# Patient Record
Sex: MALE | Race: WHITE | NOT HISPANIC OR LATINO | URBAN - METROPOLITAN AREA
[De-identification: names, ages, dates, MRNs, and addresses within clinical notes are randomized per-mention and may not be internally consistent; named-entity substitution may affect disease eponyms.]

---

## 2017-02-06 ENCOUNTER — OFFICE VISIT - HEALTHEAST (OUTPATIENT)
Dept: FAMILY MEDICINE | Facility: CLINIC | Age: 67
End: 2017-02-06

## 2017-02-06 DIAGNOSIS — B96.89 ACUTE BACTERIAL SINUSITIS: ICD-10-CM

## 2017-02-06 DIAGNOSIS — J01.90 ACUTE BACTERIAL SINUSITIS: ICD-10-CM

## 2017-02-08 ENCOUNTER — OFFICE VISIT - HEALTHEAST (OUTPATIENT)
Dept: FAMILY MEDICINE | Facility: CLINIC | Age: 67
End: 2017-02-08

## 2017-02-08 DIAGNOSIS — J32.9 SINUSITIS: ICD-10-CM

## 2017-02-08 DIAGNOSIS — L40.50 PSORIATIC ARTHROPATHY (H): ICD-10-CM

## 2017-02-08 DIAGNOSIS — Z12.11 SCREEN FOR COLON CANCER: ICD-10-CM

## 2017-02-16 ENCOUNTER — COMMUNICATION - HEALTHEAST (OUTPATIENT)
Dept: FAMILY MEDICINE | Facility: CLINIC | Age: 67
End: 2017-02-16

## 2017-02-16 DIAGNOSIS — J32.9 SINUSITIS: ICD-10-CM

## 2017-02-27 ENCOUNTER — OFFICE VISIT - HEALTHEAST (OUTPATIENT)
Dept: FAMILY MEDICINE | Facility: CLINIC | Age: 67
End: 2017-02-27

## 2017-02-27 DIAGNOSIS — J02.9 SORE THROAT: ICD-10-CM

## 2017-02-27 DIAGNOSIS — E11.9 DIABETES MELLITUS TYPE 2, NONINSULIN DEPENDENT (H): ICD-10-CM

## 2017-02-27 DIAGNOSIS — E78.00 PURE HYPERCHOLESTEROLEMIA: ICD-10-CM

## 2017-02-27 DIAGNOSIS — L40.8 OTHER PSORIASIS: ICD-10-CM

## 2017-02-27 LAB
HBA1C MFR BLD: 7.6 % (ref 3.5–6)
LDLC SERPL CALC-MCNC: 152 MG/DL

## 2017-02-27 ASSESSMENT — MIFFLIN-ST. JEOR: SCORE: 1810.9

## 2017-02-28 ENCOUNTER — COMMUNICATION - HEALTHEAST (OUTPATIENT)
Dept: FAMILY MEDICINE | Facility: CLINIC | Age: 67
End: 2017-02-28

## 2017-04-17 ENCOUNTER — COMMUNICATION - HEALTHEAST (OUTPATIENT)
Dept: ADMINISTRATIVE | Facility: CLINIC | Age: 67
End: 2017-04-17

## 2017-04-17 DIAGNOSIS — L40.50 PSORIATIC ARTHROPATHY (H): ICD-10-CM

## 2017-05-05 ENCOUNTER — RECORDS - HEALTHEAST (OUTPATIENT)
Dept: ADMINISTRATIVE | Facility: OTHER | Age: 67
End: 2017-05-05

## 2017-05-08 ENCOUNTER — RECORDS - HEALTHEAST (OUTPATIENT)
Dept: ADMINISTRATIVE | Facility: OTHER | Age: 67
End: 2017-05-08

## 2017-12-18 ENCOUNTER — OFFICE VISIT - HEALTHEAST (OUTPATIENT)
Dept: FAMILY MEDICINE | Facility: CLINIC | Age: 67
End: 2017-12-18

## 2017-12-18 DIAGNOSIS — J32.9 SINUSITIS: ICD-10-CM

## 2017-12-18 ASSESSMENT — MIFFLIN-ST. JEOR: SCORE: 1810.56

## 2018-01-22 ENCOUNTER — OFFICE VISIT - HEALTHEAST (OUTPATIENT)
Dept: FAMILY MEDICINE | Facility: CLINIC | Age: 68
End: 2018-01-22

## 2018-01-22 DIAGNOSIS — E78.00 PURE HYPERCHOLESTEROLEMIA: ICD-10-CM

## 2018-01-22 DIAGNOSIS — M54.16 LUMBAR RADICULOPATHY: ICD-10-CM

## 2018-01-22 DIAGNOSIS — L40.50 PSORIATIC ARTHROPATHY (H): ICD-10-CM

## 2018-01-22 DIAGNOSIS — J32.9 SINUSITIS: ICD-10-CM

## 2018-01-22 DIAGNOSIS — M54.9 BACK PAIN: ICD-10-CM

## 2018-01-22 DIAGNOSIS — M47.817 LUMBOSACRAL SPONDYLOSIS WITHOUT MYELOPATHY: ICD-10-CM

## 2018-01-22 DIAGNOSIS — Z23 NEED FOR VACCINATION: ICD-10-CM

## 2018-01-22 DIAGNOSIS — E11.9 DIABETES MELLITUS TYPE 2, NONINSULIN DEPENDENT (H): ICD-10-CM

## 2018-01-22 LAB
ALBUMIN UR-MCNC: ABNORMAL MG/DL
APPEARANCE UR: CLEAR
BILIRUB UR QL STRIP: ABNORMAL
COLOR UR AUTO: YELLOW
GLUCOSE UR STRIP-MCNC: ABNORMAL MG/DL
HBA1C MFR BLD: 10.7 % (ref 3.5–6)
HGB UR QL STRIP: NEGATIVE
KETONES UR STRIP-MCNC: ABNORMAL MG/DL
LEUKOCYTE ESTERASE UR QL STRIP: NEGATIVE
NITRATE UR QL: NEGATIVE
PH UR STRIP: 5.5 [PH] (ref 5–8)
SP GR UR STRIP: >=1.03 (ref 1–1.03)
UROBILINOGEN UR STRIP-ACNC: ABNORMAL

## 2018-01-22 ASSESSMENT — MIFFLIN-ST. JEOR: SCORE: 1770.3

## 2018-01-23 LAB
ALBUMIN SERPL-MCNC: 4 G/DL (ref 3.5–5)
ALP SERPL-CCNC: 90 U/L (ref 45–120)
ALT SERPL W P-5'-P-CCNC: 28 U/L (ref 0–45)
ANION GAP SERPL CALCULATED.3IONS-SCNC: 10 MMOL/L (ref 5–18)
AST SERPL W P-5'-P-CCNC: 20 U/L (ref 0–40)
BILIRUB DIRECT SERPL-MCNC: 0.2 MG/DL
BILIRUB SERPL-MCNC: 0.6 MG/DL (ref 0–1)
BUN SERPL-MCNC: 15 MG/DL (ref 8–22)
CALCIUM SERPL-MCNC: 9.3 MG/DL (ref 8.5–10.5)
CHLORIDE BLD-SCNC: 102 MMOL/L (ref 98–107)
CHOLEST SERPL-MCNC: 182 MG/DL
CO2 SERPL-SCNC: 25 MMOL/L (ref 22–31)
CREAT SERPL-MCNC: 0.68 MG/DL (ref 0.7–1.3)
FASTING STATUS PATIENT QL REPORTED: YES
GFR SERPL CREATININE-BSD FRML MDRD: >60 ML/MIN/1.73M2
GLUCOSE BLD-MCNC: 246 MG/DL (ref 70–125)
HDLC SERPL-MCNC: 35 MG/DL
LDLC SERPL CALC-MCNC: 122 MG/DL
POTASSIUM BLD-SCNC: 4.1 MMOL/L (ref 3.5–5)
PROT SERPL-MCNC: 7.6 G/DL (ref 6–8)
SODIUM SERPL-SCNC: 137 MMOL/L (ref 136–145)
TRIGL SERPL-MCNC: 127 MG/DL

## 2018-01-24 ENCOUNTER — COMMUNICATION - HEALTHEAST (OUTPATIENT)
Dept: FAMILY MEDICINE | Facility: CLINIC | Age: 68
End: 2018-01-24

## 2018-01-26 ENCOUNTER — HOSPITAL ENCOUNTER (OUTPATIENT)
Dept: MRI IMAGING | Facility: CLINIC | Age: 68
Discharge: HOME | End: 2018-01-26
Attending: FAMILY MEDICINE

## 2018-01-26 DIAGNOSIS — M54.16 LUMBAR RADICULOPATHY: ICD-10-CM

## 2018-01-30 ENCOUNTER — HOSPITAL ENCOUNTER (OUTPATIENT)
Dept: MRI IMAGING | Facility: CLINIC | Age: 68
Discharge: HOME | End: 2018-01-30
Attending: FAMILY MEDICINE

## 2018-01-31 ENCOUNTER — AMBULATORY - HEALTHEAST (OUTPATIENT)
Dept: FAMILY MEDICINE | Facility: CLINIC | Age: 68
End: 2018-01-31

## 2018-01-31 DIAGNOSIS — M54.16 LUMBAR RADICULOPATHY: ICD-10-CM

## 2018-01-31 DIAGNOSIS — M48.061 LUMBAR SPINAL STENOSIS: ICD-10-CM

## 2018-02-08 ENCOUNTER — COMMUNICATION - HEALTHEAST (OUTPATIENT)
Dept: ADMINISTRATIVE | Facility: CLINIC | Age: 68
End: 2018-02-08

## 2018-02-08 DIAGNOSIS — M19.90 OSTEOARTHRITIS: ICD-10-CM

## 2018-02-08 DIAGNOSIS — M54.16 LUMBAR RADICULOPATHY: ICD-10-CM

## 2018-02-08 DIAGNOSIS — L40.50 PSORIATIC ARTHROPATHY (H): ICD-10-CM

## 2018-02-15 ENCOUNTER — HOSPITAL ENCOUNTER (OUTPATIENT)
Dept: PHYSICAL MEDICINE AND REHAB | Facility: CLINIC | Age: 68
Discharge: HOME | End: 2018-02-15
Attending: FAMILY MEDICINE

## 2018-02-15 DIAGNOSIS — M54.16 LUMBAR RADICULITIS: ICD-10-CM

## 2018-02-15 DIAGNOSIS — M79.18 MYOFASCIAL PAIN: ICD-10-CM

## 2018-02-15 DIAGNOSIS — M51.26 LUMBAR DISC HERNIATION: ICD-10-CM

## 2018-02-15 DIAGNOSIS — M54.50 LUMBALGIA: ICD-10-CM

## 2018-02-15 ASSESSMENT — MIFFLIN-ST. JEOR: SCORE: 1725.39

## 2018-02-23 ENCOUNTER — COMMUNICATION - HEALTHEAST (OUTPATIENT)
Dept: FAMILY MEDICINE | Facility: CLINIC | Age: 68
End: 2018-02-23

## 2018-02-23 DIAGNOSIS — J32.9 SINUSITIS: ICD-10-CM

## 2018-02-26 ENCOUNTER — HOSPITAL ENCOUNTER (OUTPATIENT)
Dept: PHYSICAL MEDICINE AND REHAB | Facility: CLINIC | Age: 68
Discharge: HOME | End: 2018-02-26
Attending: PHYSICIAN ASSISTANT

## 2018-02-26 DIAGNOSIS — M54.16 LUMBAR RADICULITIS: ICD-10-CM

## 2018-02-26 DIAGNOSIS — M51.26 LUMBAR DISC HERNIATION: ICD-10-CM

## 2018-02-26 DIAGNOSIS — M54.50 LUMBALGIA: ICD-10-CM

## 2018-02-26 LAB — GLUCOSE BLDC GLUCOMTR-MCNC: 358 MG/DL (ref 70–125)

## 2018-02-27 ENCOUNTER — RECORDS - HEALTHEAST (OUTPATIENT)
Dept: ADMINISTRATIVE | Facility: OTHER | Age: 68
End: 2018-02-27

## 2018-03-01 ENCOUNTER — OFFICE VISIT - HEALTHEAST (OUTPATIENT)
Dept: FAMILY MEDICINE | Facility: CLINIC | Age: 68
End: 2018-03-01

## 2018-03-01 DIAGNOSIS — R53.83 FATIGUE: ICD-10-CM

## 2018-03-01 DIAGNOSIS — J32.9 SINUSITIS: ICD-10-CM

## 2018-03-01 LAB
HBA1C MFR BLD: 10.5 % (ref 3.5–6)
T4 FREE SERPL-MCNC: 1.1 NG/DL (ref 0.7–1.8)
TSH SERPL DL<=0.005 MIU/L-ACNC: 0.68 UIU/ML (ref 0.3–5)

## 2018-03-02 ENCOUNTER — COMMUNICATION - HEALTHEAST (OUTPATIENT)
Dept: FAMILY MEDICINE | Facility: CLINIC | Age: 68
End: 2018-03-02

## 2018-03-07 ENCOUNTER — COMMUNICATION - HEALTHEAST (OUTPATIENT)
Dept: FAMILY MEDICINE | Facility: CLINIC | Age: 68
End: 2018-03-07

## 2018-03-07 ENCOUNTER — RECORDS - HEALTHEAST (OUTPATIENT)
Dept: ADMINISTRATIVE | Facility: OTHER | Age: 68
End: 2018-03-07

## 2018-03-09 ENCOUNTER — HOSPITAL ENCOUNTER (OUTPATIENT)
Dept: PHYSICAL MEDICINE AND REHAB | Facility: CLINIC | Age: 68
Discharge: HOME | End: 2018-03-09
Attending: PHYSICIAN ASSISTANT

## 2018-05-02 ENCOUNTER — OFFICE VISIT - HEALTHEAST (OUTPATIENT)
Dept: FAMILY MEDICINE | Facility: CLINIC | Age: 68
End: 2018-05-02

## 2018-05-02 DIAGNOSIS — I10 HYPERTENSION: ICD-10-CM

## 2018-05-02 DIAGNOSIS — I63.512 CEREBRAL INFARCTION INVOLVING LEFT MIDDLE CEREBRAL ARTERY (H): ICD-10-CM

## 2018-05-02 DIAGNOSIS — E78.2 MIXED HYPERLIPIDEMIA: ICD-10-CM

## 2018-05-02 DIAGNOSIS — I63.9 STROKE (H): ICD-10-CM

## 2018-05-02 LAB
ANION GAP SERPL CALCULATED.3IONS-SCNC: 16 MMOL/L (ref 5–18)
BUN SERPL-MCNC: 16 MG/DL (ref 8–22)
CALCIUM SERPL-MCNC: 9.8 MG/DL (ref 8.5–10.5)
CHLORIDE BLD-SCNC: 101 MMOL/L (ref 98–107)
CO2 SERPL-SCNC: 24 MMOL/L (ref 22–31)
CREAT SERPL-MCNC: 0.71 MG/DL (ref 0.7–1.3)
GFR SERPL CREATININE-BSD FRML MDRD: >60 ML/MIN/1.73M2
GLUCOSE BLD-MCNC: 205 MG/DL (ref 70–125)
HBA1C MFR BLD: 10 % (ref 3.5–6)
POTASSIUM BLD-SCNC: 4 MMOL/L (ref 3.5–5)
SODIUM SERPL-SCNC: 141 MMOL/L (ref 136–145)

## 2018-05-03 ENCOUNTER — COMMUNICATION - HEALTHEAST (OUTPATIENT)
Dept: FAMILY MEDICINE | Facility: CLINIC | Age: 68
End: 2018-05-03

## 2018-05-03 ENCOUNTER — OFFICE VISIT - HEALTHEAST (OUTPATIENT)
Dept: SPEECH THERAPY | Facility: REHABILITATION | Age: 68
End: 2018-05-03

## 2018-05-03 DIAGNOSIS — I69.320 APHASIA, POST-STROKE: ICD-10-CM

## 2018-05-07 ENCOUNTER — COMMUNICATION - HEALTHEAST (OUTPATIENT)
Dept: FAMILY MEDICINE | Facility: CLINIC | Age: 68
End: 2018-05-07

## 2018-05-07 ENCOUNTER — AMBULATORY - HEALTHEAST (OUTPATIENT)
Dept: NURSING | Facility: CLINIC | Age: 68
End: 2018-05-07

## 2018-05-08 ENCOUNTER — OFFICE VISIT - HEALTHEAST (OUTPATIENT)
Dept: SPEECH THERAPY | Facility: REHABILITATION | Age: 68
End: 2018-05-08

## 2018-05-08 DIAGNOSIS — I69.320 APHASIA, POST-STROKE: ICD-10-CM

## 2018-05-10 ENCOUNTER — OFFICE VISIT - HEALTHEAST (OUTPATIENT)
Dept: OCCUPATIONAL THERAPY | Facility: REHABILITATION | Age: 68
End: 2018-05-10

## 2018-05-10 ENCOUNTER — OFFICE VISIT - HEALTHEAST (OUTPATIENT)
Dept: SPEECH THERAPY | Facility: REHABILITATION | Age: 68
End: 2018-05-10

## 2018-05-10 DIAGNOSIS — R41.3 MEMORY LOSS: ICD-10-CM

## 2018-05-10 DIAGNOSIS — Z78.9 DECREASED ACTIVITIES OF DAILY LIVING (ADL): ICD-10-CM

## 2018-05-10 DIAGNOSIS — I69.320 APHASIA, POST-STROKE: ICD-10-CM

## 2018-05-10 DIAGNOSIS — R29.898 WEAKNESS OF RIGHT ARM: ICD-10-CM

## 2018-05-15 ENCOUNTER — OFFICE VISIT - HEALTHEAST (OUTPATIENT)
Dept: SPEECH THERAPY | Facility: REHABILITATION | Age: 68
End: 2018-05-15

## 2018-05-15 DIAGNOSIS — I69.320 APHASIA, POST-STROKE: ICD-10-CM

## 2018-05-17 ENCOUNTER — OFFICE VISIT - HEALTHEAST (OUTPATIENT)
Dept: SPEECH THERAPY | Facility: REHABILITATION | Age: 68
End: 2018-05-17

## 2018-05-17 DIAGNOSIS — I69.320 APHASIA, POST-STROKE: ICD-10-CM

## 2018-05-22 ENCOUNTER — OFFICE VISIT - HEALTHEAST (OUTPATIENT)
Dept: SPEECH THERAPY | Facility: REHABILITATION | Age: 68
End: 2018-05-22

## 2018-05-22 DIAGNOSIS — I69.320 APHASIA, POST-STROKE: ICD-10-CM

## 2018-05-24 ENCOUNTER — OFFICE VISIT - HEALTHEAST (OUTPATIENT)
Dept: SPEECH THERAPY | Facility: REHABILITATION | Age: 68
End: 2018-05-24

## 2018-05-24 ENCOUNTER — RECORDS - HEALTHEAST (OUTPATIENT)
Dept: ADMINISTRATIVE | Facility: OTHER | Age: 68
End: 2018-05-24

## 2018-05-24 DIAGNOSIS — I69.320 APHASIA, POST-STROKE: ICD-10-CM

## 2018-05-29 ENCOUNTER — OFFICE VISIT - HEALTHEAST (OUTPATIENT)
Dept: SPEECH THERAPY | Facility: REHABILITATION | Age: 68
End: 2018-05-29

## 2018-05-29 DIAGNOSIS — I69.320 APHASIA, POST-STROKE: ICD-10-CM

## 2018-05-31 ENCOUNTER — OFFICE VISIT - HEALTHEAST (OUTPATIENT)
Dept: SPEECH THERAPY | Facility: REHABILITATION | Age: 68
End: 2018-05-31

## 2018-05-31 DIAGNOSIS — I69.320 APHASIA, POST-STROKE: ICD-10-CM

## 2018-06-04 ENCOUNTER — OFFICE VISIT - HEALTHEAST (OUTPATIENT)
Dept: FAMILY MEDICINE | Facility: CLINIC | Age: 68
End: 2018-06-04

## 2018-06-04 DIAGNOSIS — I10 HYPERTENSION: ICD-10-CM

## 2018-06-04 DIAGNOSIS — R47.01 EXPRESSIVE APHASIA: ICD-10-CM

## 2018-06-05 ENCOUNTER — OFFICE VISIT - HEALTHEAST (OUTPATIENT)
Dept: SPEECH THERAPY | Facility: REHABILITATION | Age: 68
End: 2018-06-05

## 2018-06-05 DIAGNOSIS — I69.320 APHASIA, POST-STROKE: ICD-10-CM

## 2018-06-07 ENCOUNTER — OFFICE VISIT - HEALTHEAST (OUTPATIENT)
Dept: SPEECH THERAPY | Facility: REHABILITATION | Age: 68
End: 2018-06-07

## 2018-06-07 DIAGNOSIS — I69.320 APHASIA, POST-STROKE: ICD-10-CM

## 2018-06-12 ENCOUNTER — OFFICE VISIT - HEALTHEAST (OUTPATIENT)
Dept: SPEECH THERAPY | Facility: REHABILITATION | Age: 68
End: 2018-06-12

## 2018-06-12 DIAGNOSIS — I69.320 APHASIA, POST-STROKE: ICD-10-CM

## 2018-06-14 ENCOUNTER — OFFICE VISIT - HEALTHEAST (OUTPATIENT)
Dept: SPEECH THERAPY | Facility: REHABILITATION | Age: 68
End: 2018-06-14

## 2018-06-14 ENCOUNTER — COMMUNICATION - HEALTHEAST (OUTPATIENT)
Dept: FAMILY MEDICINE | Facility: CLINIC | Age: 68
End: 2018-06-14

## 2018-06-14 ENCOUNTER — RECORDS - HEALTHEAST (OUTPATIENT)
Dept: ADMINISTRATIVE | Facility: OTHER | Age: 68
End: 2018-06-14

## 2018-06-14 DIAGNOSIS — I69.320 APHASIA, POST-STROKE: ICD-10-CM

## 2018-06-19 ENCOUNTER — OFFICE VISIT - HEALTHEAST (OUTPATIENT)
Dept: SPEECH THERAPY | Facility: REHABILITATION | Age: 68
End: 2018-06-19

## 2018-06-19 DIAGNOSIS — I69.320 APHASIA, POST-STROKE: ICD-10-CM

## 2018-06-21 ENCOUNTER — OFFICE VISIT - HEALTHEAST (OUTPATIENT)
Dept: SPEECH THERAPY | Facility: REHABILITATION | Age: 68
End: 2018-06-21

## 2018-06-21 DIAGNOSIS — I69.320 APHASIA, POST-STROKE: ICD-10-CM

## 2018-06-26 ENCOUNTER — OFFICE VISIT - HEALTHEAST (OUTPATIENT)
Dept: SPEECH THERAPY | Facility: REHABILITATION | Age: 68
End: 2018-06-26

## 2018-06-26 DIAGNOSIS — I69.320 APHASIA, POST-STROKE: ICD-10-CM

## 2018-06-28 ENCOUNTER — OFFICE VISIT - HEALTHEAST (OUTPATIENT)
Dept: SPEECH THERAPY | Facility: REHABILITATION | Age: 68
End: 2018-06-28

## 2018-06-28 DIAGNOSIS — I69.320 APHASIA, POST-STROKE: ICD-10-CM

## 2018-07-03 ENCOUNTER — OFFICE VISIT - HEALTHEAST (OUTPATIENT)
Dept: SPEECH THERAPY | Facility: REHABILITATION | Age: 68
End: 2018-07-03

## 2018-07-03 DIAGNOSIS — I69.320 APHASIA, POST-STROKE: ICD-10-CM

## 2018-07-05 ENCOUNTER — OFFICE VISIT - HEALTHEAST (OUTPATIENT)
Dept: SPEECH THERAPY | Facility: REHABILITATION | Age: 68
End: 2018-07-05

## 2018-07-05 DIAGNOSIS — I69.320 APHASIA, POST-STROKE: ICD-10-CM

## 2018-07-09 ENCOUNTER — COMMUNICATION - HEALTHEAST (OUTPATIENT)
Dept: FAMILY MEDICINE | Facility: CLINIC | Age: 68
End: 2018-07-09

## 2018-07-10 ENCOUNTER — OFFICE VISIT - HEALTHEAST (OUTPATIENT)
Dept: SPEECH THERAPY | Facility: REHABILITATION | Age: 68
End: 2018-07-10

## 2018-07-10 DIAGNOSIS — I69.320 APHASIA, POST-STROKE: ICD-10-CM

## 2018-07-12 ENCOUNTER — OFFICE VISIT - HEALTHEAST (OUTPATIENT)
Dept: SPEECH THERAPY | Facility: REHABILITATION | Age: 68
End: 2018-07-12

## 2018-07-12 DIAGNOSIS — I69.320 APHASIA, POST-STROKE: ICD-10-CM

## 2018-07-17 ENCOUNTER — OFFICE VISIT - HEALTHEAST (OUTPATIENT)
Dept: SPEECH THERAPY | Facility: REHABILITATION | Age: 68
End: 2018-07-17

## 2018-07-17 DIAGNOSIS — I69.320 APHASIA, POST-STROKE: ICD-10-CM

## 2018-07-19 ENCOUNTER — OFFICE VISIT - HEALTHEAST (OUTPATIENT)
Dept: SPEECH THERAPY | Facility: REHABILITATION | Age: 68
End: 2018-07-19

## 2018-07-19 DIAGNOSIS — I69.320 APHASIA, POST-STROKE: ICD-10-CM

## 2018-07-24 ENCOUNTER — OFFICE VISIT - HEALTHEAST (OUTPATIENT)
Dept: SPEECH THERAPY | Facility: REHABILITATION | Age: 68
End: 2018-07-24

## 2018-07-24 DIAGNOSIS — I69.320 APHASIA, POST-STROKE: ICD-10-CM

## 2018-07-26 ENCOUNTER — COMMUNICATION - HEALTHEAST (OUTPATIENT)
Dept: FAMILY MEDICINE | Facility: CLINIC | Age: 68
End: 2018-07-26

## 2018-07-26 DIAGNOSIS — I63.512 CEREBRAL INFARCTION INVOLVING LEFT MIDDLE CEREBRAL ARTERY (H): ICD-10-CM

## 2018-07-26 DIAGNOSIS — E78.2 MIXED HYPERLIPIDEMIA: ICD-10-CM

## 2018-07-26 DIAGNOSIS — I63.9 STROKE (H): ICD-10-CM

## 2018-08-12 ENCOUNTER — COMMUNICATION - HEALTHEAST (OUTPATIENT)
Dept: FAMILY MEDICINE | Facility: CLINIC | Age: 68
End: 2018-08-12

## 2018-08-12 DIAGNOSIS — I10 HYPERTENSION: ICD-10-CM

## 2018-08-13 ENCOUNTER — OFFICE VISIT - HEALTHEAST (OUTPATIENT)
Dept: FAMILY MEDICINE | Facility: CLINIC | Age: 68
End: 2018-08-13

## 2018-08-13 ENCOUNTER — RECORDS - HEALTHEAST (OUTPATIENT)
Dept: ADMINISTRATIVE | Facility: OTHER | Age: 68
End: 2018-08-13

## 2018-08-13 ENCOUNTER — COMMUNICATION - HEALTHEAST (OUTPATIENT)
Dept: FAMILY MEDICINE | Facility: CLINIC | Age: 68
End: 2018-08-13

## 2018-08-13 DIAGNOSIS — E78.00 PURE HYPERCHOLESTEROLEMIA: ICD-10-CM

## 2018-08-13 DIAGNOSIS — R47.01 EXPRESSIVE APHASIA: ICD-10-CM

## 2018-08-13 DIAGNOSIS — I10 HYPERTENSION: ICD-10-CM

## 2018-08-13 DIAGNOSIS — D64.9 ANEMIA: ICD-10-CM

## 2018-08-13 LAB
ALBUMIN SERPL-MCNC: 4.1 G/DL (ref 3.5–5)
ALP SERPL-CCNC: 60 U/L (ref 45–120)
ALT SERPL W P-5'-P-CCNC: 19 U/L (ref 0–45)
ANION GAP SERPL CALCULATED.3IONS-SCNC: 11 MMOL/L (ref 5–18)
AST SERPL W P-5'-P-CCNC: 16 U/L (ref 0–40)
BILIRUB DIRECT SERPL-MCNC: 0.3 MG/DL
BILIRUB SERPL-MCNC: 0.8 MG/DL (ref 0–1)
BUN SERPL-MCNC: 17 MG/DL (ref 8–22)
CALCIUM SERPL-MCNC: 9.7 MG/DL (ref 8.5–10.5)
CHLORIDE BLD-SCNC: 103 MMOL/L (ref 98–107)
CHOLEST SERPL-MCNC: 93 MG/DL
CO2 SERPL-SCNC: 27 MMOL/L (ref 22–31)
CREAT SERPL-MCNC: 0.68 MG/DL (ref 0.7–1.3)
ERYTHROCYTE [DISTWIDTH] IN BLOOD BY AUTOMATED COUNT: 12.8 % (ref 11–14.5)
FASTING STATUS PATIENT QL REPORTED: YES
FERRITIN SERPL-MCNC: 213 NG/ML (ref 27–300)
FOLATE SERPL-MCNC: >20 NG/ML
GFR SERPL CREATININE-BSD FRML MDRD: >60 ML/MIN/1.73M2
GLUCOSE BLD-MCNC: 157 MG/DL (ref 70–125)
HBA1C MFR BLD: 7.9 % (ref 3.5–6)
HCT VFR BLD AUTO: 39.1 % (ref 40–54)
HDLC SERPL-MCNC: 34 MG/DL
HGB BLD-MCNC: 13.2 G/DL (ref 14–18)
LDLC SERPL CALC-MCNC: 43 MG/DL
MCH RBC QN AUTO: 29.2 PG (ref 27–34)
MCHC RBC AUTO-ENTMCNC: 33.8 G/DL (ref 32–36)
MCV RBC AUTO: 86 FL (ref 80–100)
PLATELET # BLD AUTO: 231 THOU/UL (ref 140–440)
PMV BLD AUTO: 7.9 FL (ref 7–10)
POTASSIUM BLD-SCNC: 4.5 MMOL/L (ref 3.5–5)
PROT SERPL-MCNC: 6.8 G/DL (ref 6–8)
RBC # BLD AUTO: 4.53 MILL/UL (ref 4.4–6.2)
SODIUM SERPL-SCNC: 141 MMOL/L (ref 136–145)
TRIGL SERPL-MCNC: 81 MG/DL
VIT B12 SERPL-MCNC: 838 PG/ML (ref 213–816)
WBC: 7.5 THOU/UL (ref 4–11)

## 2018-09-10 ENCOUNTER — COMMUNICATION - HEALTHEAST (OUTPATIENT)
Dept: FAMILY MEDICINE | Facility: CLINIC | Age: 68
End: 2018-09-10

## 2018-11-09 ENCOUNTER — COMMUNICATION - HEALTHEAST (OUTPATIENT)
Dept: FAMILY MEDICINE | Facility: CLINIC | Age: 68
End: 2018-11-09

## 2018-11-09 DIAGNOSIS — E11.65 UNCONTROLLED TYPE 2 DIABETES MELLITUS WITH HYPERGLYCEMIA (H): ICD-10-CM

## 2018-11-15 ENCOUNTER — OFFICE VISIT - HEALTHEAST (OUTPATIENT)
Dept: FAMILY MEDICINE | Facility: CLINIC | Age: 68
End: 2018-11-15

## 2018-11-15 DIAGNOSIS — R47.01 EXPRESSIVE APHASIA: ICD-10-CM

## 2018-11-15 DIAGNOSIS — E11.65 UNCONTROLLED TYPE 2 DIABETES MELLITUS WITH HYPERGLYCEMIA (H): ICD-10-CM

## 2018-11-15 DIAGNOSIS — E78.00 PURE HYPERCHOLESTEROLEMIA: ICD-10-CM

## 2018-11-15 DIAGNOSIS — I10 ESSENTIAL HYPERTENSION: ICD-10-CM

## 2018-11-15 LAB
ALBUMIN SERPL-MCNC: 4 G/DL (ref 3.5–5)
ALP SERPL-CCNC: 65 U/L (ref 45–120)
ALT SERPL W P-5'-P-CCNC: 22 U/L (ref 0–45)
ANION GAP SERPL CALCULATED.3IONS-SCNC: 9 MMOL/L (ref 5–18)
AST SERPL W P-5'-P-CCNC: 20 U/L (ref 0–40)
BILIRUB DIRECT SERPL-MCNC: 0.2 MG/DL
BILIRUB SERPL-MCNC: 0.5 MG/DL (ref 0–1)
BUN SERPL-MCNC: 16 MG/DL (ref 8–22)
CALCIUM SERPL-MCNC: 10.3 MG/DL (ref 8.5–10.5)
CHLORIDE BLD-SCNC: 101 MMOL/L (ref 98–107)
CHOLEST SERPL-MCNC: 94 MG/DL
CO2 SERPL-SCNC: 31 MMOL/L (ref 22–31)
CREAT SERPL-MCNC: 0.62 MG/DL (ref 0.7–1.3)
FASTING STATUS PATIENT QL REPORTED: YES
GFR SERPL CREATININE-BSD FRML MDRD: >60 ML/MIN/1.73M2
GLUCOSE BLD-MCNC: 166 MG/DL (ref 70–125)
HBA1C MFR BLD: 7.3 % (ref 3.5–6)
HDLC SERPL-MCNC: 37 MG/DL
LDLC SERPL CALC-MCNC: 46 MG/DL
POTASSIUM BLD-SCNC: 4.8 MMOL/L (ref 3.5–5)
PROT SERPL-MCNC: 6.9 G/DL (ref 6–8)
SODIUM SERPL-SCNC: 141 MMOL/L (ref 136–145)
TRIGL SERPL-MCNC: 53 MG/DL

## 2018-11-15 ASSESSMENT — MIFFLIN-ST. JEOR: SCORE: 1571.86

## 2018-11-16 ENCOUNTER — COMMUNICATION - HEALTHEAST (OUTPATIENT)
Dept: FAMILY MEDICINE | Facility: CLINIC | Age: 68
End: 2018-11-16

## 2018-12-04 ENCOUNTER — OFFICE VISIT - HEALTHEAST (OUTPATIENT)
Dept: SPEECH THERAPY | Facility: REHABILITATION | Age: 68
End: 2018-12-04

## 2018-12-04 DIAGNOSIS — R47.01 EXPRESSIVE APHASIA: ICD-10-CM

## 2018-12-04 DIAGNOSIS — I69.320 APHASIA, POST-STROKE: ICD-10-CM

## 2018-12-12 ENCOUNTER — AMBULATORY - HEALTHEAST (OUTPATIENT)
Dept: NURSING | Facility: CLINIC | Age: 68
End: 2018-12-12

## 2018-12-31 ENCOUNTER — RECORDS - HEALTHEAST (OUTPATIENT)
Dept: ADMINISTRATIVE | Facility: OTHER | Age: 68
End: 2018-12-31

## 2019-01-10 ENCOUNTER — OFFICE VISIT - HEALTHEAST (OUTPATIENT)
Dept: SPEECH THERAPY | Facility: REHABILITATION | Age: 69
End: 2019-01-10

## 2019-01-10 DIAGNOSIS — I69.320 APHASIA, POST-STROKE: ICD-10-CM

## 2019-01-10 DIAGNOSIS — R47.01 EXPRESSIVE APHASIA: ICD-10-CM

## 2019-01-14 ENCOUNTER — OFFICE VISIT - HEALTHEAST (OUTPATIENT)
Dept: FAMILY MEDICINE | Facility: CLINIC | Age: 69
End: 2019-01-14

## 2019-01-14 DIAGNOSIS — E78.2 MIXED HYPERLIPIDEMIA: ICD-10-CM

## 2019-01-14 DIAGNOSIS — L29.9 ITCHING: ICD-10-CM

## 2019-01-14 DIAGNOSIS — K52.9 FREQUENT STOOLS: ICD-10-CM

## 2019-01-14 DIAGNOSIS — E11.65 UNCONTROLLED TYPE 2 DIABETES MELLITUS WITH HYPERGLYCEMIA (H): ICD-10-CM

## 2019-01-14 DIAGNOSIS — R47.01 EXPRESSIVE APHASIA: ICD-10-CM

## 2019-01-14 LAB
ERYTHROCYTE [DISTWIDTH] IN BLOOD BY AUTOMATED COUNT: 11.1 % (ref 11–14.5)
HCT VFR BLD AUTO: 34.9 % (ref 40–54)
HGB BLD-MCNC: 11.7 G/DL (ref 14–18)
MCH RBC QN AUTO: 30.4 PG (ref 27–34)
MCHC RBC AUTO-ENTMCNC: 33.5 G/DL (ref 32–36)
MCV RBC AUTO: 91 FL (ref 80–100)
PLATELET # BLD AUTO: 210 THOU/UL (ref 140–440)
PMV BLD AUTO: 9.1 FL (ref 7–10)
RBC # BLD AUTO: 3.84 MILL/UL (ref 4.4–6.2)
WBC: 5.8 THOU/UL (ref 4–11)

## 2019-01-15 ENCOUNTER — COMMUNICATION - HEALTHEAST (OUTPATIENT)
Dept: SCHEDULING | Facility: CLINIC | Age: 69
End: 2019-01-15

## 2019-01-15 LAB
ALBUMIN SERPL-MCNC: 3.5 G/DL (ref 3.5–5)
ALP SERPL-CCNC: 279 U/L (ref 45–120)
ALT SERPL W P-5'-P-CCNC: 169 U/L (ref 0–45)
ANION GAP SERPL CALCULATED.3IONS-SCNC: 12 MMOL/L (ref 5–18)
AST SERPL W P-5'-P-CCNC: 79 U/L (ref 0–40)
BILIRUB DIRECT SERPL-MCNC: 4.6 MG/DL
BILIRUB SERPL-MCNC: 8.2 MG/DL (ref 0–1)
BUN SERPL-MCNC: 18 MG/DL (ref 8–22)
CALCIUM SERPL-MCNC: 9.8 MG/DL (ref 8.5–10.5)
CHLORIDE BLD-SCNC: 102 MMOL/L (ref 98–107)
CK SERPL-CCNC: 60 U/L (ref 30–190)
CO2 SERPL-SCNC: 25 MMOL/L (ref 22–31)
CREAT SERPL-MCNC: 0.71 MG/DL (ref 0.7–1.3)
GFR SERPL CREATININE-BSD FRML MDRD: >60 ML/MIN/1.73M2
GLUCOSE BLD-MCNC: 325 MG/DL (ref 70–125)
POTASSIUM BLD-SCNC: 4.9 MMOL/L (ref 3.5–5)
PROT SERPL-MCNC: 6.7 G/DL (ref 6–8)
SODIUM SERPL-SCNC: 139 MMOL/L (ref 136–145)

## 2019-01-16 ENCOUNTER — AMBULATORY - HEALTHEAST (OUTPATIENT)
Dept: FAMILY MEDICINE | Facility: CLINIC | Age: 69
End: 2019-01-16

## 2019-01-16 ENCOUNTER — HOSPITAL ENCOUNTER (OUTPATIENT)
Dept: ULTRASOUND IMAGING | Facility: CLINIC | Age: 69
Discharge: HOME | End: 2019-01-16
Attending: FAMILY MEDICINE

## 2019-01-16 DIAGNOSIS — R79.89 ABNORMAL LFTS: ICD-10-CM

## 2019-01-17 ENCOUNTER — AMBULATORY - HEALTHEAST (OUTPATIENT)
Dept: FAMILY MEDICINE | Facility: CLINIC | Age: 69
End: 2019-01-17

## 2019-01-17 ENCOUNTER — HOSPITAL ENCOUNTER (OUTPATIENT)
Dept: CT IMAGING | Facility: CLINIC | Age: 69
Discharge: HOME | End: 2019-01-17
Attending: FAMILY MEDICINE

## 2019-01-17 ENCOUNTER — COMMUNICATION - HEALTHEAST (OUTPATIENT)
Dept: FAMILY MEDICINE | Facility: CLINIC | Age: 69
End: 2019-01-17

## 2019-01-17 DIAGNOSIS — K86.89 PANCREATIC MASS: ICD-10-CM

## 2019-01-17 DIAGNOSIS — K86.9 DISEASE OF PANCREAS, UNSPECIFIED: ICD-10-CM

## 2019-01-17 DIAGNOSIS — K83.1 BILIARY OBSTRUCTION (H): ICD-10-CM

## 2019-01-18 ENCOUNTER — SURGERY - HEALTHEAST (OUTPATIENT)
Dept: SURGERY | Facility: HOSPITAL | Age: 69
End: 2019-01-18

## 2019-01-18 ENCOUNTER — ANESTHESIA - HEALTHEAST (OUTPATIENT)
Dept: SURGERY | Facility: HOSPITAL | Age: 69
End: 2019-01-18

## 2019-01-18 ASSESSMENT — MIFFLIN-ST. JEOR: SCORE: 1562.78

## 2019-01-22 ENCOUNTER — OFFICE VISIT - HEALTHEAST (OUTPATIENT)
Dept: SPEECH THERAPY | Facility: REHABILITATION | Age: 69
End: 2019-01-22

## 2019-01-22 DIAGNOSIS — I69.320 APHASIA, POST-STROKE: ICD-10-CM

## 2019-01-23 ENCOUNTER — OFFICE VISIT - HEALTHEAST (OUTPATIENT)
Dept: FAMILY MEDICINE | Facility: CLINIC | Age: 69
End: 2019-01-23

## 2019-01-23 DIAGNOSIS — C25.0 ADENOCARCINOMA OF HEAD OF PANCREAS (H): ICD-10-CM

## 2019-01-23 LAB
ALBUMIN SERPL-MCNC: 3.5 G/DL (ref 3.5–5)
ALP SERPL-CCNC: 203 U/L (ref 45–120)
ALT SERPL W P-5'-P-CCNC: 113 U/L (ref 0–45)
AST SERPL W P-5'-P-CCNC: 56 U/L (ref 0–40)
BILIRUB DIRECT SERPL-MCNC: 2.5 MG/DL
BILIRUB SERPL-MCNC: 4.5 MG/DL (ref 0–1)
PROT SERPL-MCNC: 6.7 G/DL (ref 6–8)

## 2019-01-24 ENCOUNTER — COMMUNICATION - HEALTHEAST (OUTPATIENT)
Dept: ONCOLOGY | Facility: HOSPITAL | Age: 69
End: 2019-01-24

## 2019-01-25 ENCOUNTER — RECORDS - HEALTHEAST (OUTPATIENT)
Dept: ADMINISTRATIVE | Facility: OTHER | Age: 69
End: 2019-01-25

## 2019-01-29 ENCOUNTER — OFFICE VISIT - HEALTHEAST (OUTPATIENT)
Dept: SPEECH THERAPY | Facility: REHABILITATION | Age: 69
End: 2019-01-29

## 2019-01-29 DIAGNOSIS — I69.320 APHASIA, POST-STROKE: ICD-10-CM

## 2019-01-31 ENCOUNTER — OFFICE VISIT - HEALTHEAST (OUTPATIENT)
Dept: SPEECH THERAPY | Facility: REHABILITATION | Age: 69
End: 2019-01-31

## 2019-01-31 DIAGNOSIS — I69.320 APHASIA, POST-STROKE: ICD-10-CM

## 2019-02-01 ENCOUNTER — RECORDS - HEALTHEAST (OUTPATIENT)
Dept: ADMINISTRATIVE | Facility: OTHER | Age: 69
End: 2019-02-01

## 2019-02-25 ENCOUNTER — COMMUNICATION - HEALTHEAST (OUTPATIENT)
Dept: FAMILY MEDICINE | Facility: CLINIC | Age: 69
End: 2019-02-25

## 2019-02-25 DIAGNOSIS — I63.512 CEREBRAL INFARCTION INVOLVING LEFT MIDDLE CEREBRAL ARTERY (H): ICD-10-CM

## 2019-02-25 DIAGNOSIS — I63.9 STROKE (H): ICD-10-CM

## 2019-03-25 ENCOUNTER — RECORDS - HEALTHEAST (OUTPATIENT)
Dept: ADMINISTRATIVE | Facility: OTHER | Age: 69
End: 2019-03-25

## 2019-05-30 ENCOUNTER — COMMUNICATION - HEALTHEAST (OUTPATIENT)
Dept: FAMILY MEDICINE | Facility: CLINIC | Age: 69
End: 2019-05-30

## 2019-05-30 DIAGNOSIS — I10 ESSENTIAL HYPERTENSION: ICD-10-CM

## 2019-09-26 ENCOUNTER — COMMUNICATION - HEALTHEAST (OUTPATIENT)
Dept: FAMILY MEDICINE | Facility: CLINIC | Age: 69
End: 2019-09-26

## 2021-05-26 ENCOUNTER — RECORDS - HEALTHEAST (OUTPATIENT)
Dept: ADMINISTRATIVE | Facility: CLINIC | Age: 71
End: 2021-05-26

## 2021-05-29 NOTE — TELEPHONE ENCOUNTER
Former patient of ? Jakob & has not established care with another provider.  Please assign refill request to covering provider per Clinic standard process.      Refill Approved    Rx renewed per Medication Renewal Policy. Medication was last renewed on 11/15/18.    Aida Cantor, [facility] Triage/Med Refill 5/31/2019     Requested Prescriptions   Pending Prescriptions Disp Refills     losartan (COZAAR) 100 MG tablet [Pharmacy Med Name: LOSARTAN 100MG TABLETS] 90 tablet 0     Sig: TAKE 1 TABLET(100 MG) BY MOUTH DAILY       Angiotensin Receptor Blocker Protocol Passed - 5/30/2019  3:21 PM        Passed - PCP or prescribing provider visit in past 12 months       Last office visit with prescriber/PCP: 1/23/2019 Rogelio Robert MD OR same dept: 1/23/2019 Rogelio Robert MD OR same specialty: 1/23/2019 Rogelio Robert MD  Last physical: Visit date not found Last MTM visit: Visit date not found   Next visit within 3 mo: Visit date not found  Next physical within 3 mo: Visit date not found  Prescriber OR PCP: Rogelio Robert MD  Last diagnosis associated with med order: 1. Essential hypertension  - losartan (COZAAR) 100 MG tablet [Pharmacy Med Name: LOSARTAN 100MG TABLETS]; TAKE 1 TABLET(100 MG) BY MOUTH DAILY  Dispense: 90 tablet; Refill: 0    If protocol passes may refill for 12 months if within 3 months of last provider visit (or a total of 15 months).             Passed - Serum potassium within the past 12 months     Lab Results   Component Value Date    Potassium 4.9 01/14/2019             Passed - Blood pressure filed in past 12 months     BP Readings from Last 1 Encounters:   01/23/19 126/74             Passed - Serum creatinine within the past 12 months     Creatinine   Date Value Ref Range Status   01/14/2019 0.71 0.70 - 1.30 mg/dL Final

## 2021-05-29 NOTE — TELEPHONE ENCOUNTER
Please deny. Pt has transferred care to [facility]. Pt confirmed this and will notify [facility] pharmacy.

## 2021-05-30 VITALS — WEIGHT: 235.6 LBS | BODY MASS INDEX: 35.82 KG/M2

## 2021-05-30 VITALS — WEIGHT: 236.2 LBS | HEIGHT: 68 IN | BODY MASS INDEX: 35.8 KG/M2

## 2021-05-31 VITALS — WEIGHT: 236.13 LBS | HEIGHT: 68 IN | BODY MASS INDEX: 35.79 KG/M2

## 2021-05-31 VITALS — WEIGHT: 229 LBS | BODY MASS INDEX: 34.71 KG/M2 | HEIGHT: 68 IN

## 2021-06-01 VITALS — BODY MASS INDEX: 29.35 KG/M2 | WEIGHT: 193 LBS

## 2021-06-01 VITALS — WEIGHT: 199 LBS | BODY MASS INDEX: 30.26 KG/M2

## 2021-06-01 VITALS — HEIGHT: 68 IN | WEIGHT: 219.1 LBS | BODY MASS INDEX: 33.21 KG/M2

## 2021-06-01 VITALS — BODY MASS INDEX: 30.49 KG/M2 | WEIGHT: 200.5 LBS

## 2021-06-01 VITALS — BODY MASS INDEX: 33.33 KG/M2 | WEIGHT: 216 LBS

## 2021-06-01 NOTE — TELEPHONE ENCOUNTER
Jurgen, Letty KENYON, [facility]           3:18 PM   Note      Please deny. Pt has transferred care to [facility]. Pt confirmed this and will notify [facility]

## 2021-06-02 ENCOUNTER — RECORDS - HEALTHEAST (OUTPATIENT)
Dept: ADMINISTRATIVE | Facility: CLINIC | Age: 71
End: 2021-06-02

## 2021-06-02 VITALS — WEIGHT: 187 LBS | HEIGHT: 67 IN | BODY MASS INDEX: 29.35 KG/M2

## 2021-06-02 VITALS — BODY MASS INDEX: 28.98 KG/M2 | WEIGHT: 185 LBS

## 2021-06-02 VITALS — BODY MASS INDEX: 29.03 KG/M2 | WEIGHT: 185 LBS | HEIGHT: 67 IN

## 2021-06-02 VITALS — WEIGHT: 178 LBS | BODY MASS INDEX: 27.88 KG/M2

## 2021-06-08 NOTE — PROGRESS NOTES
Assessment:  1.  Sinusitis.  2.  Underlying psoriasis and psoriatic arthritis.  3.  Needs screening for colon cancer.    Plan: Recommend he stay on the Augmentin 875 mg twice a day to the rest of this week and see how he does.  If he is not improving by next Monday we could consider switching him to a different agent such as azithromycin or levofloxacin..  Since he does not wish to follow-up with Dr. Crockett , will refer him back to dermatology at [facility] as he requests.  We'll refer him for screening colonoscopy with Draed Brizuela since he felt that had gone well when he had his last in 2005.  Spent in excess of 25 minutes with at least 50% in counseling regarding his numerous questions.    Subjective: 66-year-old male presenting for follow-up of recent infection for which he was seen at the walk-in clinic.  He has been taking the Augmentin 875 mg twice a day for 3 days with dose this morning.  He still is having some greenish colored sputum.  No new symptoms.  He was hoping he would've been more improved by now.  He said that the person at the walk-in clinic told him he had really big tonsils.  He notes he is frustrated with his management of his psoriatic arthritis and that he has been off of his methotrexate since last May.  He continues on the Enbrel.  Hehad worsening of his psoriasis and states that was happening even when he was still on the methotrexate.  He states that Dr. Crockett did not want to try a new medicine that he had seen on TV.  See the last notes by rheumatology.  He denies any current fever, no current facial pain.  He is using a salt water nose spray and is using fluticasonein the last day or 2 now.  He had that left over from last year.  History reviewed. No pertinent past medical history.  COMMENT:med  Allergies   Allergen Reactions     Naproxen Hives     He is willing to get scheduled for screening colonoscopy but does not want to do that until at least April.  All other  review of systems are currently negative except for the congestion in the nose and sinus area.    Objective:  Visit Vitals     /88     Pulse 81     Resp 16     SpO2 93%     Head normocephalic.  External ears and TMs normal.  Eyes negative.  Nasal congestion present.  No facial tenderness.  Minimal enlargement of the tonsils with no exudates and no significant erythema.  Neck supple without adenopathy or thyromegaly.  Lungs are clear.  Heart regular rate and rhythm without murmur.  Abdomen shows no masses tenderness or paraspinal megaly.  No pitting edema at the ankle.  He does have psoriasis and large patches the arms and the legs.

## 2021-06-08 NOTE — PROGRESS NOTES
ASSESSMENT:   1. Acute bacterial sinusitis  amoxicillin-clavulanate (AUGMENTIN) 875-125 mg per tablet       Patient presents for nasal congestion for the last 4 days.  Patient was slightly hypertensive to 152/80 upon presentation, however otherwise vitally within normal limits.  No indication of acute hypertensive crisis.  This is likely close to his baseline, and he has a follow-up with his primary care doctor better this week.  Symptoms most suggestive of sinusitis.  Since he is on chronic immunosuppressing medications, it is appropriate to treat with antibiotics today.  No indications of more severe etiology requiring imaging or lab workup today, including sinus abscess or mass, intracranial mass or hemorrhage, pneumonia, heart failure.  Discussed continued symptomatic care and medication. Discussed red flags for immediate return to clinic/ER, as well as indications for follow up if no improvement. Patient understood and agreed to plan. Patient was stable for discharge.    *I was masked during all patient interactions. Patient refused masking.        PLAN:  Your symptoms are most likely due to a sinus infection. I will send a prescription for Augmentin to your pharmacy. Please take as directed for 10 days. Take with food. Use a probiotic. You could take this every day, even when you're not sick. It may help your immune system. I would also recommend Vit. D supplementation.    You could also try a neti-pot to help with congestion. You can try Robitussin or Mucinex to help loosen the mucous. You can also use ibuprofen to help decrease inflammation.    If you develop fevers, trouble breathing, or any new, concerning symptoms, return immediately for re-evaluation. Otherwise, follow up with primary care if not getting better in 10 days.            SUBJECTIVE:   Brendon Ojeda is a 66 y.o. male with a history of psoriatic arthritis, HLD, T2DM, who presents today for evaluation of nasal congestion for the last 4 days.  "He admits to sore throat, ear \"itching\", ear popping, muffled hearing, and sinus pressure. He has an occasional wet cough, productive of greenish mucous which feels like it is coming from the back of the throat. He also admits to dry mouth. No fevers, but he has had some chills. He feels more tired than usual. No nausea, vomiting, dizziness, vision changes, body aches.   He has been using Flonase spray for symptoms, which is not helping. No recent travel. No known ill contacts. He did get an influenza vaccine this year.   He is on Enbrel for Psoriatic arthritis.    Past Medical History:  Patient Active Problem List   Diagnosis     Type 2 Diabetes Mellitus     Essential Hypercholesterolemia     Psoriasis     Primary Osteoarthritis Of The Lumbar Vertebrae     Psoriatic Arthropathy     Vitamin D Deficiency     Lumbar Radiculopathy     High risk medication use     Primary osteoarthritis involving multiple joints     Osteoarthritis of midfoot, unspecified laterality       Surgical History:  Reviewed; Non-contributory      Family History:  Reviewed; Non-contributory      Social History:    History   Smoking Status     Never Smoker   Smokeless Tobacco     Never Used     Smoking: none  Alcohol use: rarely  Other drug use: none  Occupation: works for [employer]      Current Medications:  Current Outpatient Prescriptions on File Prior to Visit   Medication Sig Dispense Refill     cholecalciferol, vitamin D3, (VITAMIN D3) 2,000 unit Tab Take by mouth daily.       etanercept (ENBREL SURECLICK) 50 mg/mL (0.98 mL) PnIj Inject 50 mg under the skin every 7 days. 4 Syringe 11     multivitamin (MULTIVITAMIN) per tablet Take 1 tablet by mouth daily.       methotrexate 2.5 MG tablet TAKE 6 TABLETS BY MOUTH EVERY WEEK 48 tablet 0     No current facility-administered medications on file prior to visit.        Allergies:   Allergies   Allergen Reactions     Naproxen Hives       I personally reviewed patient's past medical, surgical, social, " family history and allergies.    ROS:  Review of Systems  See HPI      OBJECTIVE:   Visit Vitals     /80     Pulse 78     Temp 98.4  F (36.9  C) (Oral)     Resp 18     Wt (!) 235 lb 9.6 oz (106.9 kg)     SpO2 92%     BMI 35.82 kg/m2         General Appearance:  Alert, well-appearing old male in NAD. Afebrile.    Integument: Warm, dry.  HEENT:  Head: Atraumatic, normocephalic. Face nontraumatic.  Eyes: Conjunctiva clear, Lids normal.  Ears:  TMs very mildly erythematous but still translucent bilaterally. No canal erythema or edema.  Nose: nares patent. Mild erythema of nasal mucosa. No rhinorrhea. + frontal and maxillary sinus tenderness to palpation.  Oropharynx: No trismus. ++ posterior pharyngeal erythema. 2+ symmetric tonsillar hypertrophy. Uvula midline. Moist mucus membranes.  Neck: Supple  Respiratory: No distress. Lungs clear to ausculation bilaterally. No crackles, wheezes, rhonchi or stridor.  Cardiovascular: Regular rate and rhythm, no murmur, rub or gallop. No obvious chest wall deformities.   Neurologic: Alert and orientated appropriately. No focal deficits.  Psych: Flat affect.

## 2021-06-09 NOTE — PROGRESS NOTES
Assessment:  1.  Sore throat, concerned about thyroid.  2.  Underlying non-insulin-dependent diabetes mellitus.  3.  Psoriasis.  #4.  Underlying hyperlipidemia.  5.  Upper respiratory infection/sinusitis, improved.    Plan: We mutually decided to not use further antibiotic today.  He'll call or return if not gradually improving here in the next several weeks.  Check T4 and TSH due to his concern about thyroid.  Check A1c, basic profile and direct LDL since he is not being followed elsewhere for his diabetes and has not had this test done here in some time.  He is very reluctant to take any medication for diabetes or lipids etc.  He has not yet seen the new rheumatologist.  [facility]  But he is scheduled to see another rheumatologist.  Spent in excess of 25 minutes with at least 50% in counseling regarding the various issues.    Subjective: 66-year-old male presenting for follow-up regarding recent infection.  See the note of February 8 and the subsequent phone calls.  He notes he still has some soreness in the throat and some phlegm production.  He does note that the colored phlegm has largely gone now.  He does note that he is about 70% better, gradually over the last 3 weeks.  But concerned that he should be better.  Notes that he's had some soreness in the throat for the last 8 months.  Has not yet seen the rheumatologist.  See the  February 8 note regarding that discussion.  He states he is fasting since about 10:00 this morning.  It is currently about 3:30 in the afternoon.  He states it is okay if I do some of his diabetic lab tests.  No past medical history on file.  Current Outpatient Prescriptions   Medication Sig Dispense Refill     cholecalciferol, vitamin D3, (VITAMIN D3) 2,000 unit Tab Take by mouth daily.       etanercept (ENBREL SURECLICK) 50 mg/mL (0.98 mL) PnIj Inject 50 mg under the skin every 7 days. 4 Syringe 11     multivitamin (MULTIVITAMIN) per  "tablet Take 1 tablet by mouth daily.       omeprazole (PRILOSEC OTC) 20 MG tablet Take 20 mg by mouth Daily before breakfast.       No current facility-administered medications for this visit.      Allergies   Allergen Reactions     Naproxen Hives     He notes that he has been drooling off and on for years and is concerned about that.  And then he states that his mouth can be dry.    Objective:  Visit Vitals     /80 (Patient Site: Right Arm, Patient Position: Sitting, Cuff Size: Adult Large)     Pulse 74  Comment: regular     Temp 98.1  F (36.7  C) (Oral)     Resp 14  Comment: regular     Ht 5' 8\" (1.727 m)     Wt (!) 236 lb 3.2 oz (107.1 kg)     BMI 35.91 kg/m2     Head normocephalic.  External ears and TMs look unremarkable.  Eyes nose and oropharynx look unremarkable.  Neck supple without adenopathy or thyromegaly.  Lungs clear.  Heart regular rate and rhythm without murmur.  Abdomen negative.  No pedal edema.  Does have psoriasis rash present including in the left elbow extensor surface.  He did have normal monofilament sensation in all areas tested of both feet.  Pulses okay and no sign of any ulceration.  "

## 2021-06-14 NOTE — PROGRESS NOTES
DATE OF SERVICE: 12/18/2017    SUBJECTIVE:  A very pleasant 67-year-old gentleman who presents today for cough,  sore throat and runny nose for the past year.  He has gone to multiple physicians  and has never reached resolution.  Occasionally he is given antibiotics which help  the symptoms but then it returns.    REVIEW OF SYSTEMS:  No fever, chills, nausea, vomiting or diarrhea.    PAST MEDICAL HISTORY:  Notable for psoriasis and he takes methotrexate.    OBJECTIVE:  VITAL SIGNS:  138/72, pulse 82, respirations 16.  HEENT:  TMs clear sclerae.  Pharynx is erythematous with postnasal drainage, mild  pain to palpation over the maxillary sinuses bilaterally.    NECK:  Supple.  No lymphadenopathy.  LUNGS:  Clear to auscultation.  HEART:  Regular rhythm, normal S1, S2.    ASSESSMENT:  1.  Sinusitis.  Plan Levaquin 750 daily x14.  2.  Patient will follow up in 14 days for recheck.  3.  Patient does not check blood sugars and will discuss diabetes management at a  later date.      CECILIO CRAIG MD  pa  JAMEY 12/18/2017 09:04:30  T 12/18/2017 09:42:08  R 12/18/2017 09:42:08  [id number]        cc:RACHANA CRAIG MD

## 2021-06-15 NOTE — PROGRESS NOTES
Assessment:  1.  Back pain.  2.  Underlying osteoarthritis of lumbar spine.  3.  Underlying psoriatic arthropathy.  4.  Non-insulin-dependent diabetes mellitus, uncontrolled.  5.  Hyperlipidemia.  #6.  Sinusitis and bronchitis.  In the setting of diabetes and immunosuppression.  #7.  Lumbar radiculopathy.    Plan: Start Augmentin generic 875 mg-1 p.o. twice daily-#20 for the sinus infection.  He was given an Accu-Chek guide glucometer.  Prescribed Accu-Chek guide test strips as well as fast clicks lancets-#100 each refillable ×5-use each twice daily for checking blood sugars.  Record blood sugars and let me know here after 1-2 weeks the results he is getting to decide whether to further increase metformin.  We will start metformin 500 mg-1 p.o. twice daily with meals-#60 refillable ×3 at this time.  Explained that his A1c is far too high and that at a minimum we need to get it below 8%.  He is concerned that he does not want to have to come back for office visits, being worried about the co-pay.  I explained that it is important to reduce risk of complications to have him get the diabetes under control as well as the current infection and also then be pursuing this issue of his back and leg pain.  He wanted to go ahead and have MRI of the lumbar spine given his past trouble in 2011, and given his leg symptoms that is reasonable and appropriate to do.  Then depending on results we will decide whether to refer to spine clinic or not.  He will certainly follow-up with Dr. Slick Mccollum his rheumatologist regarding the issue of the psoriatic arthropathy.  Checking lipid, basic and hepatic profiles.  Reassured him he did not have any sign of kidney infection given the current urinalysis results.  Spent in excess of 40 minutes with least 50% in counseling here.  I did advise that he see diabetic nurse educator for further instruction but he declines that.  He states that he knows how to check his blood sugar from the past  when it was done before.  He states that he has all the books at home on how to follow the diet.  He asks if he has to get further blood tests regarding diabetic control in the future as he does not want to have to do that.  I explained that in order to achieve optimal control and lessen his risk of complications it is necessary for regular follow-up visits.  At this point we can see what kind of blood sugar results he gets here in the next week or 2 and then depending on that and his response to the metformin can decide when it would be best for him to follow-up.  Depending on the MRI result, can decide whether to pursue spine clinic referral or whether to just have him follow-up with Dr. Mccollum.  He was also given Prevnar and minimization as that was due.    Subjective: 67-year-old male presenting for follow-up and/or evaluation of multiple issues.  He has had trouble with back pain and leg cramping actually for about a year.  He states it is like he had about 10 years ago.  He notes that it usually starts when he is in bed and can be helped by getting up and moving around.  Sometimes he has it happened in the right leg if he is driving for a long time.  He notes in the last 3-4 months that at times walking can be a challenge.  He notes his psoriasis has been flaring up and he wants to get into see Dr. Mccollum, his rheumatologist in the near future because he states his current regimen is not working well to manage that.  He notes that just in the last several days he started coughing up a yellowish greenish sputum.  No high fever.  But the cough has been persisting.  He does not check blood sugars.  He does not like to get his diabetes rechecked.  See past notes regarding that.  But he is fasting today and being seen at the end of the afternoon, he notes he started fasting last evening.  She has had more pain radiating down the right leg.  At times cramping in the right foot in the right leg.  States he has had  "frequent urination.  He does not feel his psoriasis or the psoriatic arthropathy are well controlled on his current regimen and does plan to get back to Dr. Slick Mccollum.  He has not checked blood sugars at all and has never taken any medication for his diabetes.  He has not wanted to have any regular follow-up for that.  He has been noncompliant on follow-up.  Last visit here for the diabetes was in February 2017, 11 months ago.  History reviewed. No pertinent past medical history.  Allergies   Allergen Reactions     Naproxen Hives     Current medications include the methotrexate 2.5 mg-8 tablets per week, multiple vitamin, and vitamin D.  Past Surgical History:   Procedure Laterality Date     NV REMOVAL GALLBLADDER      Description: Cholecystectomy;  Proc Date: 11/11/1997;       History reviewed. No pertinent past medical history.  Current Outpatient Prescriptions   Medication Sig Dispense Refill     cholecalciferol, vitamin D3, (VITAMIN D3) 2,000 unit Tab Take by mouth daily.       methotrexate 2.5 MG tablet Take by mouth. Take 8 tabs weekly       multivitamin (MULTIVITAMIN) per tablet Take 1 tablet by mouth daily.       No current facility-administered medications for this visit.      Objective:/80  Pulse 80  Ht 5' 7.5\" (1.715 m)  Wt (!) 229 lb (103.9 kg)  BMI 35.34 kg/m2  HEENT shows no acute change except for mild nasal congestion.  TMs are normal.  Oropharynx is negative.  Minimal nasal congestion.  Neck supple without adenopathy or thyromegaly.  Lungs sound clear with no wheezes rales or rhonchi.  Mild cough present.  Heart regular rate and rhythm without murmur.  Abdomen shows no masses tenderness or hepatosplenomegaly.  No pedal edema.  Does have monofilament sensation in all areas tested of both feet.  Does have 2+ dorsalis pedis pulses bilateral.  Is alert with clear speech.    Urinalysis is negative for infection, has mild proteinuria and ketones.  A1c is significantly elevated at 10.7%.  "

## 2021-06-16 NOTE — PROGRESS NOTES
Pt was very adamant that he did NOT want his blood sugars checked today, did not understand why we had to do it, and stated he was in the 200's at home prior.  Checked here, blood sugar was at 358.  Pt was informed that it was a safety protocol we needed to follow and would have to reschedule for another day, to make sure it was below 300.

## 2021-06-16 NOTE — PROGRESS NOTES
ASSESSMENT:     Diagnoses and all orders for this visit:    Lumbalgia    Lumbar radiculitis    Lumbar disc herniation    Myofascial pain            Brendon Ojeda is a 67 y.o. male  with a BMI of Body mass index is 33.81 kg/(m^2). who presents today in consultation at the request of Rogelio Lee MD  for new patient evaluation of subacute low back pain radiating to the right anterior thigh, right dorsal foot.  Patient symptoms are likely due to the small right foraminal disc herniation and annular fissure at the L3-L4 with mild right neural foraminal stenosis and, and the small right foraminal disc herniation at the L4-L5, with facet arthropathy,  that abuts the inferior margin of the exiting right L4 nerve root.  No red flags.        GARIMA:  0  WHO-5:  0    PLAN:  A shared decision making model was used.  The patient's values and choices were respected.  The following represents what was discussed and decided upon by the physician and the patient.      1.  DIAGNOSTIC TESTS: Patient's lumbar MRI shows small right foraminal disc herniation and annular fissure at the L3-L4 with mild right neural foraminal stenosis.  At the L4-L5 there is a small right foraminal disc herniation that abuts the inferior margin of the exiting right L4 nerve root, and severe bilateral facet arthropathy causing mild to moderate right neural foraminal stenosis.  Patient also has moderate to severe L4-L5 spinal canal stenosis.  Patient has no symptoms of neurogenic claudication.  2.  PHYSICAL THERAPY:  Discussed the importance of core strengthening, ROM, stretching exercises with the patient and how each of these entities is important in decreasing pain.  Explained to the patient that the purpose of physical therapy is to teach the patient a home exercise program.  These exercises need to be performed every day in order to decrease pain and prevent future occurrences of pain.  Likened it to brushing one's teeth.  Patient declines  physical therapy at this time due to financial cost.  I provided patient with a paper copy of exercises for the lumbar spine to strengthening the core muscles.  3.  MEDICATIONS: Patient declines any medication treatments.  Patient stated that he takes ibuprofen 200 mg only as needed and very rarely maybe once a week.  4.  INTERVENTIONS: Patient had right L3-L4 transforaminal epidural steroid injection back in April 2011 at [facility] that resolved his anterior thigh pain. I recommend to start with right L3-L4 transforaminal epidural steroid injection to help with right anterior thigh pain.  Patient also has disc herniation, and facet arthropathy at the right L3-L4, spinal canal stenosis that is probably contributing to his symptoms pain in the right dorsal foot in the L5 nerve distribution.     Patient was wondering if he can  have right L3-L4, right L4-L5 both levels to cover the right anterior thigh, and the right dorsal foot.  If he continues to have problem with his right dorsal foot pain we will consider right L4-L5, or right L5-S1 transforaminal epidural injection in the future.  I discussed this case with Dr. Weston.    5.  PATIENT EDUCATION: I thoroughly discussed the plan with the patient today.  He verbalizes understanding and agrees with the plan.      FOLLOW-UP:   Patient will follow up with me in 2 weeks.  All questions were answered.      GENARO Cruz, MPA-C          SUBJECTIVE:  Brendon Ojeda  Is a 67 y.o. male who presents today for new patient evaluation of low back pain.  Patient reports chronic low back pain since 2011, with radicular pain to the right lower extremity.  Patient states that back in 2011 he noticed his low back pain and the radicular pain to the right lower extremity after shoveling snow.  At that time he had a lumbar MRI that showed disc herniation at the L3-L4.  He received right L3-L4 transforaminal epidural steroid injection at [facility] on April  29 of 2011.  Patient stated that since that time he did not have any problem with the radicular pain to the right lower extremity.  He reports reappearance of increased low back pain with right anterior thigh muscle pain, and pain at the right dorsal foot.  Patient states that the pain at the right dorsal foot is new.  Patient states that the right dorsal foot pain is intermittent in nature however the right anterior thigh pain is more constant.  At this time he reports 0-1 out of 10 intensity pain in the lower back with minimum pain in the right anterior thigh and the right dorsal foot.  His symptoms are aggravated by sitting for too long, standing for too long and rates it at 8 out of 10 intensity on its worse.  His symptoms are alleviated by walking, stretching, and occasionally taking ibuprofen 200 mg 2 tablets may be every week.  Patient states that he was offered physical therapy in the past and he declined since it is cost effective for him.  He did not take medication in the past.  He believes that he needs only to have a repeat of the injection to help with his symptoms today.      His lumbar MRI shows small right foraminal disc herniation and annular fissure at the L3-L4 with mild right neural foraminal stenosis.  At the L4-L5 there is a small right foraminal disc herniation that abuts the inferior margin of the exiting right L4 nerve root, and severe bilateral facet arthropathy causing mild to moderate right neural foraminal stenosis.  Patient also has moderate to severe L4-L5 spinal canal stenosis.    He denies history of back surgeries.  He denies history of motor vehicle accidents.  Patient denies urinary or bowel incontinence, unintentional weight loss, saddle anesthesia, fever or chills, balance difficulties.          Medications:    Current Outpatient Prescriptions   Medication Sig Dispense Refill     blood glucose test (ACCU-CHEK GUIDE) strips Use 1 each As Directed 2 (two) times a day. Dispense  brand per patient's insurance at pharmacy discretion. 100 strip 5     cholecalciferol, vitamin D3, (VITAMIN D3) 2,000 unit Tab Take by mouth daily.       etanercept 50 mg/mL (0.98 mL) PnIj Inject 50 mg under the skin.       folic acid (FOLVITE) 1 MG tablet TK 1 T PO  QD  2     generic lancets (ACCU-CHEK FASTCLIX) Dispense brand per patient's insurance at pharmacy discretion. Use twice daily to test blood sugar. 100 each 5     metFORMIN (GLUCOPHAGE) 500 MG tablet Take 1 tablet (500 mg total) by mouth 2 (two) times a day with meals. 60 tablet 3     methotrexate 2.5 MG tablet Take by mouth. Take 8 tabs weekly       multivitamin (MULTIVITAMIN) per tablet Take 1 tablet by mouth daily.       No current facility-administered medications for this encounter.        Allergies:   Allergies   Allergen Reactions     Naproxen Hives       PMH: Osteoarthritis.  Psoriatic arthropathy.  Non-insulin independent diabetes mellitus uncontrolled.  Hyperlipidemia.    PSH:    Past Surgical History:   Procedure Laterality Date     MA REMOVAL GALLBLADDER      Description: Cholecystectomy;  Proc Date: 11/11/1997;       Family History:      Social History:   Social History     Social History     Marital status:      Spouse name: N/A     Number of children: N/A     Years of education: N/A     Occupational History     Not on file.     Social History Main Topics     Smoking status: Never Smoker     Smokeless tobacco: Never Used     Alcohol use No     Drug use: No     Sexual activity: Not on file     Other Topics Concern     Not on file     Social History Narrative       ROS: Low back pain radiating to the right anterior thigh, right dorsal foot.  Specifically negative for bowel/bladder dysfunction, fevers,chills, appetite changes, unexplained weight loss.   Otherwise 13 systems reviewed are negative.  Please see the patient's intake questionnaire from today for details.      OBJECTIVE:  PHYSICAL EXAMINATION:    CONSTITUTIONAL:  Vital signs  as above.  No acute distress.  The patient is well nourished and well groomed.  PSYCHIATRIC:  The patient is awake, alert, oriented to person, place, time and answering questions appropriately with clear speech.    SKIN:  Skin over the face, bilateral lower extremities, and posterior torso is clean, dry, intact without rashes.    GAIT:  Gait is non-antalgic.  The patient is able to heel and toe walk without significant difficulty.    STANDING EXAMINATION: Patient has mild tenderness at the L4-L5, L5-S1 bilaterally.  MUSCLE STRENGTH:  The patient has 5/5 strength for the bilateral hip flexors, knee flexors/extensors, ankle dorsiflexors/plantar flexors, great toe extensors, ankle evertors/invertors.  NEUROLOGICAL:  2/4 patellar, medial hamstring, and achilles reflexes bilaterally.  Negative Babinski's bilaterally.  No ankle clonus bilaterally. Sensation to light touch is intact in the bilateral L4, L5, and S1 dermatomes.  VASCULAR:  2/4  pulses bilaterally.  Bilateral lower extremities are warm.  There is no pitting edema of the bilateral lower extremities.  ABDOMINAL:  Soft, non-distended, non-tender throughout all quadrants.  No pulsatile mass palpated in the left lower quadrant.  LYMPH NODES:  No palpable or tender inguinal/popliteal lymph nodes.  MUSCULOSKELETAL: Negative straight leg raise bilaterally.  Negative hip impingement Aashish test bilaterally.      RESULTS:     [facility]  MR LUMBAR SPINE WO CONTRAST  1/30/2018 5:29 PM      INDICATION: Low back pain. Lower extremity radicular pain.  TECHNIQUE: Without IV contrast.  CONTRAST: None.  COMPARISON: Lumbar spine MRI 4/26/2011.     FINDINGS: Nomenclature is based on 5 lumbar-type vertebral bodies. Normal vertebral body heights. No marrow edema. Stable, scattered chronic Schmorl nodes. 2 mm L4-L5 degenerative spondylolisthesis. No pars defect. The conus tip is identified at L1.   Grossly normal paraspinal soft tissues. No abdominal aortic aneurysm. The  visualized portions of the bony pelvis and proximal femora are normal for age.     T12-L1: Mild disc height loss. Moderate disc T2 signal loss. No herniation. Mild bilateral facet arthropathy. No spinal canal stenosis. No right neural foraminal stenosis. No left neural foraminal stenosis.     L1-L2: Mild disc height loss. Moderate disc T2 signal loss. No herniation. Mild bilateral facet arthropathy. No spinal canal stenosis. No right neural foraminal stenosis. No left neural foraminal stenosis.     L2-L3: Normal disc height. Moderate disc T2 signal loss. Small left foraminal disc protrusion. Mild bilateral facet arthropathy. No spinal canal stenosis. No right neural foraminal stenosis. No left neural foraminal stenosis.     L3-L4: Moderate disc height and T2 signal loss. Mild posterior annular bulge. Small right foraminal disc herniation and annular fissure. Mild bilateral facet arthropathy. No spinal canal stenosis. Mild right neural foraminal stenosis. No left neural   foraminal stenosis.     L4-L5: 2 mm degenerative spondylolisthesis. Mild disc height loss. Moderate disc T2 signal loss. Moderate unroofing of the disc. Small right foraminal disc herniation. Moderate to severe bilateral facet arthropathy. Moderate to severe spinal canal   stenosis. Mild to moderate right neural foraminal stenosis. Mild left neural foraminal stenosis.     L5-S1: 1 mm degenerative spondylolisthesis. Normal disc height. Mild disc T2 signal loss. Moderate bilateral facet arthropathy. Mild left lateral recess stenosis. No central spinal canal stenosis. No right neural foraminal stenosis. Mild left neural   foraminal stenosis.     IMPRESSION:   CONCLUSION:  1.  Moderate to severe L4-L5 spinal canal stenosis. This has progressed.  2.  Mild to moderate right L4-L5 neural foraminal stenosis. This is in part due to a new small disc herniation which abuts the inferior margin of the exiting right L4 nerve root.

## 2021-06-16 NOTE — PROGRESS NOTES
Assessment:  1.  Non-insulin-dependent diabetes mellitus, not yet controlled.  2.  Fatigue.  3.  Sinusitis, symptomatically now improving.    Plan: Prescribed metformin 1000 mg extended release-#60-refill ×2-take 1 pill twice daily with meals.  Explained that would reduce the chance of diarrhea and then down the line if he is having improved control we could just have him take 1 pill once a day for an easier regimen.  Continue his excellent diet efforts and his weight reduction efforts is that we will continue to pay off down the line.  Will check T4 and TSH because of his tiredness.  Finish out the Augmentin prescription and explained that the sinus infection is in the process of clearing up but inflammation takes longer to get cleared up.  I think it is appropriate that he is rescheduled for the steroid injection 1 week from tomorrow and it is likely his blood sugar will be adequate for that.  Explained that he will have to have the blood sugar rechecked again before that procedure can be done.  He is going to try to do the exercises and see if he gets enough improvement that he does not have to do the steroid injection.  That certainly is appropriate to try.  Asked him to call me in 2 weeks with a status of how he is doing overall in his blood sugar results to decide further steps here.  Given his reluctance to take any medication, I did not try to encourage him to take the aspirin or statin medication etc. as I felt that would likely be fruitless and only get him further irritated.  Did spend in excess of 25 minutes with least 50% in counseling.    Subjective: 67-year-old male presenting for follow-up on multiple issues.  He did start the Augmentin again on Monday 3 days ago when he notes that he is not having the yellow or greenish colored drainage from the sinuses anymore.  Now it is only whitish colored material.  He does feel crackling in his ears.  He brings in his blood sugar readings which she has been  taking at least once a day and sometimes twice a day and all of the readings are in the 200-260 type range.  Just this morning it was 201 which is the lowest reading that he has had.  He did increase his metformin to taking 2 of the 500 mg pills twice daily on Monday after we had talked.  He has had some loose stools but he attributes that to the Augmentin that he is on for his sinus infection which it happened when he was on that before.  He would like to have the thyroid test checked because of his fatigue.  He had thought that that could affect his diabetes and his blood sugars.  I did explain that I would not expect low thyroid or high thyroid to be causing hypoglycemia for him.  History reviewed. No pertinent past medical history.  Allergies   Allergen Reactions     Naproxen Hives     Current Outpatient Prescriptions   Medication Sig Dispense Refill     amoxicillin-clavulanate (AUGMENTIN) 875-125 mg per tablet TAKE 1 TABLET BY MOUTH TWICE DAILY FOR 10 DAYS 20 tablet 0     blood glucose test (ACCU-CHEK GUIDE) strips Use 1 each As Directed 2 (two) times a day. Dispense brand per patient's insurance at pharmacy discretion. 100 strip 5     cholecalciferol, vitamin D3, (VITAMIN D3) 2,000 unit Tab Take by mouth daily.       etanercept 50 mg/mL (0.98 mL) PnIj Inject 50 mg under the skin.       folic acid (FOLVITE) 1 MG tablet TK 1 T PO  QD  2     generic lancets (ACCU-CHEK FASTCLIX) Dispense brand per patient's insurance at pharmacy discretion. Use twice daily to test blood sugar. 100 each 5     methotrexate 2.5 MG tablet Take by mouth. Take 8 tabs weekly       multivitamin (MULTIVITAMIN) per tablet Take 1 tablet by mouth daily.       metFORMIN (FORTAMET) 1000 MG (OSM) 24 hr tablet Take 1 tablet (1,000 mg total) by mouth 2 (two) times a day with meals. 60 tablet 2     No current facility-administered medications for this visit.      Note that the above list of medications is after the changes today.  He notes that he  has been on 8 pills weekly of the methotrexate for a while and that is higher than the previous 6 pills and he wondered if that could affect his blood sugars and on review of up-to-date, it does show that it is possible for methotrexate to affect diabetic control.  I did explain though that there is no way to be sure regarding that.  He was very frustrated that he had to have a blood sugar checked at the spine clinic before the procedure could have been done last week and I explained that was a safety protocol.  He has been very careful with his diet and watching his food intake and cutting out chips etc. and states that is why he has had the weight reduction.    Objective:/82  Pulse 78  Wt 216 lb (98 kg)  BMI 33.33 kg/m2  HEENT examination shows normal TMs, nose is unremarkable.  Oropharynx negative.  Neck supple without adenopathy or thyromegaly.  Lungs clear.  Heart regular rate and rhythm without murmur.  Abdomen shows no masses tenderness or paraspinal megaly.  No pedal edema.  Is alert with clear speech.  Note that his weight is down 13 pounds from 1 month ago.

## 2021-06-17 NOTE — PROGRESS NOTES
Speech Language/Pathology  [facility]   Speech Therapy Daily Progress     Patient Name: Brendon Ojeda  Date: 5/10/2018  Visit #: 3  Referral Diagnosis: [unfilled]  Referring provider: Samaar Renae MD  Visit Diagnosis:     ICD-10-CM    1. 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    Goal Status:   Long Term Goals  Pt. will improve cognitive, linguistic skills to allow for completion of daily activities and interactions by: : by 12 weeks  Short Term Goals  Follow Verbal Directions: 2 step verbal directions;with 90% accuracy;with minimal cuing;in 6 weeks  Pt. will answer multiple choice questions regarding information in a : simple written paragraph;with minimal cuing;with 70% accuracy;in 6 weeks  Pt. will form sentences to explain, describe with : with minimal cuing;with 70% accuracy;with 80% accuracy;in 12 weeks  Patient will write single words with : with minimal cuing;with 90% accuracy;in 8 weeks              Plan / Patient Education:     Continue with initial plan of care. Patient appears to benefit from verbal/visual cues. Patient noted to omit articles readily and requires mod cues to identify correct articles while accounting for plural versus singular. Provided patient with additional worksheets to address W/E and R/C independently outside of therapy.      Subjective:     Patient presents as alert and cooperative during the session.  Pain Ratin    Patient arrived to therapy; appeared well groomed. Patient brought homework provided of which he had completed with >80% accuracy independently. Patient able to correct errors given SLP feedback and mod verbal cues.     Objective:     Cognition/Communication  Reading Aloud (where is/are the ...): 75% accuracy independently at identifying and using correct article and reading aloud. Patient able  "to identify various items given picture stimuli with 100% accuracy independently suggesting functional comprehension of written word.     Utterance \"build up\": (word, phrase, sentence) given written stimuli,   Single word- 8/9 independently  Phrase- 4/9 independently; improved to 8/9 given mod-max verbal and visual cues  Sentence- 5/9 independently; improved to 9/9 given mod-max verbal and visual cues    Descriptions: (using utterance build up strategy)   Single word- 80% accuracy independently; improved to 100% given mod verbal assistance  Phrase- 80% accuracy independently; improved to 100% given mod verbal assistance  Sentence- 20% accuracy independently; improved to 100% given mod verbal assistance    Reading Comprehension (paragraph length): 100% accuracy independently. Patient noted to make multiple paraphasic errors and substitutions during oral reading however demonstrated functional comprehension despite difficulty reading/decoding written stimuli. Patient used folder to eliminate extraneous background words.    Interventions Today   Interventions MINUTES   Speech - Language 53   Cognition    Dysphagia    Assistive technology    Voice            Total 53     Elizabeth Nix MS, CCC-SLP  5/10/2018    "

## 2021-06-17 NOTE — PROGRESS NOTES
[facility] Certification Request    May 10, 2018      Patient: Brendon Ojeda  MR Number: [id number]  YOB: 1950  Date of Visit: 5/10/2018      Dear Dr. Rogelio Robert:    Thank you for this referral.   We are seeing Brendon Ojeda in Occupational Therapy for stroke rehab.    Medicare and/or Medicaid requires physician review and approval of the treatment plan. Please review the plan of care and verify that you agree with the therapy plan of care by co-signing this note.    Plan of Care  Authorization / Certification Start Date: 05/10/18  Authorization / Certification End Date: 08/08/18  Authorization / Certification Number of Visits: 6  Communication with: Referral Source  Patient Related Instruction: Treatment plan and rationale;Nature of Condition;Basis of treatment;Expected outcome  Times per Week: 1  Number of Weeks: 12  Number of Visits: 6  Neuromuscular Reeducation: stroke rehabilitation  Functional Training (ADL's): self care;ADL's;compensatory training  Cognitive Skills Development:      Goals:  Patient Will Demonstrate / Verbalize independence in self-management of condition in: 2 weeks  Patient will be independent with home exercise program in: 2 weeks      If you have any questions or concerns, please don't hesitate to call.    Sincerely,      Odalis Xie, OT        Physician recommendation:     ___ Follow therapist's recommendation        ___ Modify therapy      *Physician co-signature indicates they certify the need for these services furnished within this plan and while under their care.      [facility]   ADL Initial Evaluation    Patient Name: Brendon Ojeda  Date of evaluation: 5/10/2018  Referral Diagnosis: cerebral infarction  Referring provider: Samara Renae MD  Visit Diagnosis:     ICD-10-CM    1. Decreased activities of daily living (ADL) Z78.9    2. Memory loss R41.3    3. Weakness of right arm R29.898        Assessment:        Pt. is  appropriate for skilled OT intervention as outlined in the Plan of Care (POC). Patient scored 5.2 on LACL today indicating that he is in an appropriate living environment. Patient's at a level 5.2 may need assistance with monitoring medication set up and refills and long term financial planning. He would like to drive again and will be discussing this at his upcoming neurology visit. Patient does have a mild weakness on his right UE but reports that it's not affecting his function. I recommended he continue to use it normally and if he would like me to re-assess his strength in 3-4 weeks to make sure it's Improving, he could schedule a follow up to do that. He reports that his memory is pretty good and that they taught him compensatory strategies in the hospital. He will continue to use his calendar and let me know if he has a need for more treatment in regard to memory. At this time, there is no need for ongoing OT, however, patient informed that his chart is open should he identify an area of concern.    Goals:  Patient Will Demonstrate / Verbalize independence in self-management of condition in: 2 weeks  Patient will be independent with home exercise program in: 2 weeks    Patient's expectations/goals are realistic.    Barriers to Learning or Achieving Goals:  Language barriers.       Plan / Patient Instructions:        Plan of Care:   Authorization / Certification Start Date: 05/10/18  Authorization / Certification End Date: 08/08/18  Authorization / Certification Number of Visits: 6  Communication with: Referral Source  Patient Related Instruction: Treatment plan and rationale;Nature of Condition;Basis of treatment;Expected outcome  Times per Week: 1  Number of Weeks: 12  Number of Visits: 6  Neuromuscular Reeducation: stroke rehabilitation  Functional Training (ADL's): self care;ADL's;compensatory training  Cognitive Skills Development:      Plan for next visit: to be determined if patient returns      Subjective:        Social information:   Living Situation:single family home. Split entry and lives with his wife.   Occupation:retired   Work Status:NA     History of Present Illness:    Brendon is a 67 y.o. male who presents to therapy today with complaints of expressive aphasia and slight memory problem. Date of onset/duration of symptoms is 2018. Onset was sudden. Symptoms are getting better. He denies history of similar symptoms. He describes their previous level of function as not limited. Functional limitations are described as occurring with writing and speaking as a result of expressive aphasia. Patient does not feel his mild weakness in his right UE or his forgetfulness are interfering with his function.     Pain Ratin  Pain description: N/A       Objective:      Note: Items left blank indicates the item was not performed or not indicated at the time of the evaluation.    Patient Outcome Measures:    [facility] Cognitive Level Screen (_/6.0): 5.2    ADL Examination  1. Decreased activities of daily living (ADL)     2. Memory loss     3. Weakness of right arm       Precautions/Restrictions: None  Involved side: Right  Posture Observation:      General standing posture is normal.    Prior Level of Function: independent with ADL's and IADL's                            Current level of function           I  SBA  MIN  MOD MAX DEP   Equipment   Transfers           Bed x         Toilet  x         Tub/shower x         Car  x         Bed mobility x         ADL's          Eating x         Grooming x         Bathing x         UE dressing x         LE dressing x         Toileting x         IADL's          Meal prep x         House work x         Laundry  x         Telephone       Expressive aphasia   Driving       Not yet          N/A   Finances x         Outdoor ADL x         Grocery shop x         Medication mgmt x         Writing x         Typing  x         Dynamic sitting x         Dynamic  standing x               Upper Extremity ROM/Strength:    Right                     Left     * indicates pain   AROM   MMT/5   AROM   MMT/5     Shoulder Flexion 180    WNL   4   WNL   5     Shoulder Extension  60    WNL   4   WNL   5     Shoulder Abduction  180    WNL      WNL        Elbow Flexion  150    WNL   4   WNL   5     Elbow Extension 0    WNL   4   WNL   5     Forearm Supination  80    WNL   4   WNL   5     Forearm Pronation  80    WNL   4   WNL   5     Wrist Flexion  80    WFL   4   WFL   5     Wrist Extension  80    WFL   4   WFL   5      Strength 50#  72#      3 point pinch  15#  20#      Lateral pinch    22#      24#        Sensation: Patient reports normal    Coordination:                           Right                                           Left                    NT   WNL   Impaired    NT     WNL    Impaired     Gross motor      x         x        Fine motor      x         x        9 hole peg   x         x            Hand AROM:                            Right                                           Left                    NT   WNL   Impaired    NT     WNL    Impaired     Claw fist         x         x     Flat fist      x         x        Full fist      x         x        LE Orthoses:   No    UE Orthoses:  No    Cognitive Evaluation:  Cognitive function is appropriate for current living environment. Patient scored a 5.2 on LACL today. He is oriented X person, place, time and purpose.     Visual - perceptual Assessment:  Visual-perceptual assessment not indicated.    Pre-Driving Screening: Recommend he discus this with neurologist at the upcoming appt in 6 weeks. We do not offer driving assessments here at [facility].    Treatment Today: Patient instructed to increase resistance for existing  and pinch exercises.   TREATMENT MINUTES COMMENTS   Evaluation 35    Self-care/ Home management     Manual therapy     Neuromuscular Re-education 10    Therapeutic Exercises     Orthotic fitting            Total 45    Blank areas are intentional and mean the treatment did not include these items.     GOALS AND PLAN OF CARE WERE ESTABLISHED IN COOPERATION WITH THE PATIENT    OT Evaluation Code: (Please list factors)   Comorbidities: DM type 2, psoriasis, HLD   Profile/History Review: Brief    Need for eval modification: No    # Treatment options: Limited    Clinical Decision Making:  Low      Occupational Profile/ Medical and Therapy History and Comorbidities Occupational Performance Clinical Decision Making   (Complexity)   brief history with review of medical/therapy records related to the presenting problem.  No comorbidities 1-3 Performance deficits that result in activity limitations and/or participation restrictions.    No Assessment Modification  Low complexity, which includes  problem-focused assessments, and consideration of a limited number of treatment options.      expanded review of medical/therapy records and additional review of physical, cognitive and psychosocial history.    May have comorbidities 3-5 Performance deficits that result in activity limitations and/or participation restrictions.    Minimal to moderate modification of assessment Moderate complexity, which includes analysis of data from detailed assessments, and consideration of several treatment options.         Review of medical/therapy records and extensive additional review of physical, cognitive and psychosocial history.  Comorbidities affect occupational performance 5 or more Performance deficits that result in activity limitations and/or participation restrictions.    Significant modification of assessment High complexity, analysis of  Occupational profile and data,  Comprehensive assessments, multiple treatment options.          Odalis Xie  5/10/2018  8:37 AM

## 2021-06-17 NOTE — PROGRESS NOTES
Assessment:  1.  Status post cerebrovascular accident with expressive aphasia, scan actually showed multiple strokes.  2.  Non-insulin-dependent diabetes mellitus, poorly controlled.  3.  Hyperlipidemia recently started on lipid-lowering medication.  4.  Hypertension, after extensive discussion he is willing to start low dose of losartan.  5.  Occipital headache that may be related to the hypertension.    Plan: Start losartan 25 mg-1 p.o. daily-#30 refillable ×1.  Renewed his clopidogrel 75 mg daily and simvastatin 40 mg every afternoon-H #30 in each refillable ×1.  Recommend careful diabetic diet with avoiding sugars and high-fat foods and work on gradual weight reduction with goal of losing 1 pound per week.  Do go ahead with the speech therapy OT and PT that have been ordered for at home.  Follow-up in 1 month to see me but earlier as needed.  Follow-up sooner if the headache is not improving.  Spent over 25 minutes with least 50% in counseling.    Subjective: 67-year-old male presenting for follow-up of recent hospitalization where he had expressive aphasia that was found to be from stroke.  See hospital notes.  He is here with his wife today.  In the past he has always refused taking statin medication for his hyperlipidemia.  He has not wanted to take medication for his diabetes etc.  He is frustrated having the difficulty with his expressive aphasia.  His sister works at [facility] apparently on an inpatient ruiz where they do seven-day rehab for stroke patients as an inpatient process and she told him that she thought he should do that.  I explained that the neurologist usually decides when someone needs inpatient stroke rehab versus the outpatient therapy etc.  Past Medical History:   Diagnosis Date     DM (diabetes mellitus)      Psoriatic arthritis      Allergies   Allergen Reactions     Naproxen Hives     Current medication includes the clopidogrel 75 mg per day, E turn a separate injection weekly,  "metformin 1000 mg twice daily, methotrexate 2.5 mg tablets, and simvastatin 40 mg every afternoon.  He and his wife state that he did try using\" natural pill\" for control of the blood sugar and it did not help at all and he had tried that for 3 weeks.  He is now taking the metformin.  He describes a occipital headache that he has had since he got home.  No new symptoms otherwise.  No numbness or weakness.    Objective:/78 (Patient Site: Left Arm, Patient Position: Sitting, Cuff Size: Adult Large)  Pulse 61  Resp 20  Wt 200 lb 8 oz (90.9 kg)  SpO2 99%  BMI 30.49 kg/m2  HEENT exam shows no acute change.  Neck supple.  Lungs clear.  Heart regular rate and rhythm.  No pitting edema at the ankle.  He definitely has expressive aphasia but is able to say a variety of things but had difficulty stating a variety of certain words.  "

## 2021-06-17 NOTE — PROGRESS NOTES
[facility]   Speech Therapy Daily Progress     Patient Name: Brendon Ojeda  Date: 2018  Visit #: 2  Referral Diagnosis: Expressive aphasia, cerebral infarction involving the left middle cerebral artery  Referring provider: Samara Renae MD  Visit Diagnosis:     ICD-10-CM    1. Aphasia, post-stroke I69.920          Session 2 of 24    Assessment:   Patient is benefitting from skilled speech therapy and is making steady progress toward functional goals.  Patient is appropriate to continue with skilled speech therapy intervention, as indicated by initial plan of care.  Short Term Goals  Follow Verbal Directions: 2 step verbal directions;with 90% accuracy;with minimal cuing;in 6 weeks(progressing)  Pt. will answer multiple choice questions regarding information in a : simple written paragraph;with minimal cuing;with 70% accuracy;in 6 weeks  Pt. will form sentences to explain, describe with : with minimal cuing;with 70% accuracy in 12 weeks(progressing)  Patient will write single words  : with minimal cuing;with 90% accuracy;in 8 weeks(progressing)  Patient will orally read sentences with 90% accuracy in 6 weeks.(progressing)      Plan / Patient Education:     Continue with initial plan of care.    Subjective:     Patient presents as alert and cooperative during the session.  Pain Ratin        Objective:     Auditory comprehension: Pt followed 2 step verbal directions with 90% accuracy. Pt was able to answer multiple choice questions regarding verbal information in short paragraphs with 100% accuracy.  Verbal expression: Patient was able to form sentences to describe action pictures with 30% accuracy. He tended to omit the subjects and the articles. He would substitute words.  Written expression: Pt was able to write single words with 40% accuracy. Patient filled in missing letters of single words with 50% accuracy.  Oral reading: Patient was able to orally read short sentences with 90%  accuracy independently.      Interventions Today   Interventions MINUTES   Speech - Language 55   Cognition    Dysphagia    Assistive technology    Voice            Total 55     Mary Sheehan MS, CCC-SLP  5/8/2018

## 2021-06-17 NOTE — PROGRESS NOTES
I met with Brendon Ojeda at the request of Dr. Robert to recheck his blood pressure.  Blood pressure medications on the MAR were reviewed with patient.    Patient has taken all medications as per usual regimen: Yes  Patient reports tolerating them without any issues or concerns: Yes    Vitals:    05/07/18 1320   BP: 120/72   Pulse: 65   SpO2: 98%       Blood pressure was taken, previous encounter was reviewed, recorded blood pressure below 140/90.  Patient was discharged and the note will be sent to the provider for final review.

## 2021-06-17 NOTE — PROGRESS NOTES
[facility] Speech Therapy   Evaluation and Initial Plan of Care    Patient Name: Brendon Ojeda  Date of evaluation: 5/3/2018  Referral Diagnosis: Expressive aphasia, cerebral infarction involving left middle cerebral artery  Referring provider: Samara Renae MD  Visit Diagnosis:     ICD-10-CM    1. Aphasia, post-stroke I69.920          Assessment:      Patient presents with moderate receptive and expressive aphasia.    Long Term Goals  Pt. will improve cognitive, linguistic skills to allow for completion of daily activities and interactions by: : by 12 weeks  Short Term Goals  Follow Verbal Directions: 2 step verbal directions;with 90% accuracy;with minimal cuing;in 6 weeks  Pt. will answer multiple choice questions regarding information in a : simple written paragraph;with minimal cuing;with 70% accuracy;in 6 weeks  Pt. will form sentences to explain, describe with : with minimal cuing;with 70% accuracy;with 80% accuracy;in 12 weeks  Patient will write single words with : with minimal cuing;with 90% accuracy;in 8 weeks  Patient will orally read sentences with 90% accuracy in 6 weeks.    Patient goal:  To speak and understand better  Patient's expectations/goals are realistic.    Rehab potential: Good based on prior level of function, evaluation results and time post onset.     Plan / Patient Instructions:      Plan of Care:  Authorization / Certification Start Date: 05/03/18  Authorization / Certification End Date: 08/01/18  Authorization / Certification Number of Visits: 24  Communication with: Referral Source  Patient Related Instruction: Nature of Condition;Treatment plan and rationale;Basis of treatment;Expected outcome  Times per Week: 2  Number of Weeks: 12  Number of Visits: 24    Plan:   Speech therapy 2 times a week for 12 weeks.  Ongoing  Patient, Family instruction in  evaluation results, treatment plan/rationale and expected functional outcome.    Education:   Patient, Family participated  in education regarding evaluation results, treatment plan/rationale and expected functional outcome .The timeline for stroke recovery was discussed with the patient.  Patient, Family demonstrate understanding the education provided.        Subjective:         Social information:   Living Situation:single family home and lives with others    Occupation:retired    History of Present Illness:    Brendon is a 67 y.o. male who presents to therapy today with complaints of difficulty speaking and understanding. Date of onset/duration of symptoms is April 27, 2018.  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 He received some speech therapy while hospitalized and then was discharged to home. He was referred to outpatient speech therapy for continued speech/language therapy.    Pertinent history includes : Cerebral infarction involving left middle cerebral artery, DM2,   Patient presents as alert and cooperative during the session.  Patient reports no pain     Objective:      Examination    Oral motor function was mildly impaired.  There is a mild facial droop on the right side. There is a decrease in ROM on the right during lip retraction.  Motor speech was not impaired.   Speech intelligibility was approximately 100 % at the single word level    Voice was not impaired.     Trach/Vent: N/A  Portions of the [place] Test for Differential Diagnosis of Aphasia along with portions of The Reading comprehension Battery for Aphasia were administered.  Auditory Comprehension:    Patient follows 1 step directions with 100 % accuracy, 2-step directions with 80% and 3 step directions with 40% accuracy.  Patient answers  moderate yes/no questions with 100 % accuracy, and complex questions with 60% accuracy.  Patient can follow  simple conversation, comprehension breaks down with more complex, lengthy information.     Reading Comprehension:  Patient can comprehend  simple paragraphs with 50 %  accuracy.  Patient can comprehend  moderate sentences with 100 % accuracy.  Patient can orally read  moderate sentences with 100 % accuracy.  Patient can orally read  simple paragraphs with 50 % accuracy. His oral reading is slow and halting.    Verbal Expression:  Patient produces automatic speech with 100 % accuracy.  Patient performs sentence completion tasks with 100 % accuracy.  Patient completes responsive naming task with 100 % accuracy.  Patient completes confrontation naming task with 90 % accuracy.  Patient answers simple single word questions with 100 % accuracy.  Patient  cannot form sentences in conversation. He can begin a sentence but not finish it due to aphasia and word recall deficits.   His verbal expression is slow and halting, and at times repetitive in nature.  Patient can form sentences to describe with 33 % accuracy.  Patient can form sentences to explain with 33 % accuracy.    Written Expression:  Patient can write  Name with 100% accuracy, and address correctly except for misspelling the city.  Patient can write 3 letter single words with 80 % accuracy.    Cognition: Not assessed at this time due to time constraints and focus on speech/language assessment.    Interventions Today  Interventions MINUTES   Speech - Language 60   Cognition    Dysphagia    Assistive technology    Voice            Total 60          Mary Sheehan MS, CCC-SLP  5/3/2018                                                                                                  Medicare/Medicaid Certification      May 3, 2018        Patient: Brendon Ojeda   MR Number: [id number]   YOB: 1950   Date of Visit: 5/3/2018       Dear Dr. Rogelio Robert,    Thank you for this referral.   We are seeing Brendon Ojeda for Speech Language Therapy.    Medicare and/or Medicaid requires physician review and approval of the treatment plan. Please review the plan of care and verify that you agree with the therapy plan of care  by co-signing this note.      Plan of Care  Authorization / Certification Start Date: 05/03/18  Authorization / Certification End Date: 08/01/18  Authorization / Certification Number of Visits: 24  Communication with: Referral Source  Patient Related Instruction: Nature of Condition;Treatment plan and rationale;Basis of treatment;Expected outcome  Times per Week: 2  Number of Weeks: 12  Number of Visits: 24    Goals  Pt. will improve cognitive, linguistic skills to allow for completion of daily activities and interactions by: : by 12 weeks  Follow Verbal Directions: 2 step verbal directions;with 90% accuracy;with minimal cuing;in 6 weeks  Pt. will answer multiple choice questions regarding information in a : simple written paragraph;with minimal cuing;with 70% accuracy;in 6 weeks  Pt. will form sentences to explain, describe with : with minimal cuing;with 70% accuracy;with 80% accuracy;in 12 weeks  Patient will write single words with : with minimal cuing;with 90% accuracy;in 8 weeks  Pt will: be able to orally read sentences with min cues in 6 weeks.        If you have any questions or concerns, please don't hesitate to call.    Sincerely,    Mary Sheehan MS, CCC-SLP        Physician recommendation:     ___ Follow therapist's recommendation        ___ Modify therapy      I certify the need for these services furnished within this plan and while under my care.    Thank you.

## 2021-06-18 NOTE — PROGRESS NOTES
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  Patient is appropriate to continue with skilled speech therapy intervention, as indicated by initial plan of care.  Goal Status:   Long Term Goals  Pt. will improve cognitive, linguistic skills to allow for completion of daily activities and interactions by: : by 12 weeks  Short Term Goals  Follow Verbal Directions: 2 step verbal directions;with 90% accuracy;with minimal cuing;in 6 weeks(Met)  Pt. will answer multiple choice questions regarding information in a : simple written paragraph;with minimal cuing;with 70% accuracy;in 6 weeks  Pt. will form sentences to explain, describe with : with minimal cuing;with 70% accuracy;with 80% accuracy;in 12 weeks(progressing)  Patient will write single words with : with minimal cuing;with 90% accuracy;in 8 weeks(progressing)  Pt will: be able to orally read sentences with min cues in 6 weeks.      Plan / Patient Education:     Continue with initial plan of care.    Subjective:     Patient presents as alert and cooperative during the session.  Pain Ratin        Objective:     Homework was completed: He wrote in a verb to complete sentences with 100% accuracy. He wrote in a missing object in a sentence with 95% accuracy. He copied 4 letter words with 100% accuracy.  Oral reading: Pt orally read  Lists of short sentences with 100% accuracy and 80% accuracy.  Auditory comprehension: Pt could follow 2 step verbal directions with 100% accuracy.  Verbal expression: Patient answered \"why\" questions with a " phrase with 95% accuracy. Pt was able to explain the steps to complete an activity with 95% accuracy. He occasionally needs min cues to formulate correct sentences to express his thoughts. He was able to re-tell short verbally presented paragraphs with 66% accuracy uncued with 33% cued by therapist. Some word recall deficits were noted.  Writing: Pt was able to write in a word to answer a question with 70% accuracy.      Interventions Today   Interventions MINUTES   Speech - Language 55   Cognition    Dysphagia    Assistive technology    Voice            Total 55     Mary Sheehan MS, CCC-SLP  5/29/2018

## 2021-06-18 NOTE — PROGRESS NOTES
Assessment:  1.  Hypertension, now controlled.  2.  Non-insulin-dependent diabetes mellitus.  3.  Some loose stools from metformin, but tolerable at this time.  4.  Occipital headache which is probably from cervical arthritis.  5.  Expressive aphasia, improved.  Due to his stroke.    Plan: Recommend he check some blood sugars 2 hours after lunch or 2 hours after supper, continue to try to follow careful diet to pursue further weight reduction.  To continue on the current dose of metformin.  Follow-up at minimum within 2 months.  He and his wife were trying to get to his grandsons graduation from  this morning and did not want to have blood work done today.  Explained that if his A1c is not improved significantly we will need to start second medication for diabetic control.  Do continue current dose of losartan.  Refills done on losartan.  Continue with speech therapy for the expressive aphasia.  Okay to follow-up sooner as needed if blood sugars not improving.  He did not want to try any higher dose of the losartan to see if that would affect his occipital headache.  His wife notes that the headache was there before the stroke happened.  Whether or not it is because of the losartan, his headache is better than what it was 1 month ago.    Subjective: 67-year-old male presenting for follow-up as above.  No new issues.  He states he still has the occipital headache but it is not as much as it was when he was here 1 month ago.  He is not having any trouble taking the losartan 50 mg a day.  Here with his wife today.  They are wanting to go to his grandsons  graduation.  Blood sugars have ranged from 150s-200 when checked each morning and this last month.  Readings have tended to be better than they were before.  His stools are occasionally loose but not typically more than once a day.  Sometimes are formed.  .pmhg  Allergies   Allergen Reactions     Naproxen Hives     Current Outpatient  Prescriptions   Medication Sig Dispense Refill     blood glucose test (ACCU-CHEK GUIDE) strips Use 1 each As Directed 2 (two) times a day. Dispense brand per patient's insurance at pharmacy discretion. 100 strip 5     cholecalciferol, vitamin D3, (VITAMIN D3) 2,000 unit Tab Take 2,000 Units by mouth daily.        clopidogrel (PLAVIX) 75 mg tablet Take 1 tablet (75 mg total) by mouth daily. 30 tablet 1     etanercept 50 mg/mL (0.98 mL) PnIj Inject 50 mg under the skin once a week. On Fridays       folic acid (FOLVITE) 1 MG tablet        generic lancets (ACCU-CHEK FASTCLIX) Dispense brand per patient's insurance at pharmacy discretion. Use twice daily to test blood sugar. 100 each 5     losartan (COZAAR) 50 MG tablet Take 1 tablet (50 mg total) by mouth daily. Take one tablet by mouth daily 30 tablet 1     metFORMIN (FORTAMET) 1000 MG (OSM) 24 hr tablet Take 1 tablet (1,000 mg total) by mouth 2 (two) times a day with meals. 60 tablet 2     methotrexate 2.5 MG tablet Take 10 mg by mouth once a week. Take 4 tabs weekly on Saturdays       multivitamin (MULTIVITAMIN) per tablet Take 1 tablet by mouth daily.       simvastatin (ZOCOR) 40 MG tablet Take 1 tablet (40 mg total) by mouth daily. 30 tablet 1     No current facility-administered medications for this visit.      All other review of systems currently negative for any other changes.    Objective:/68  Pulse 66  Resp 14  Wt 199 lb (90.3 kg)  SpO2 99%  BMI 30.26 kg/m2  HEENT shows no acute change.  No cervical adenopathy.  Lungs clear.  Heart regular rate and rhythm without murmur.  Abdomen shows no masses tenderness or hepatosplenomegaly.  No pitting edema at the ankle.  He does have an easier time finding words and expressing himself than what it was 1 month ago.  He still had some hesitancy.

## 2021-06-18 NOTE — PROGRESS NOTES
[facility]   Speech Therapy Daily Progress     Patient Name: Brendon Ojeda  Date: 2018  Visit #: 6  Referral Diagnosis: expressive aphasia, cerebral infarction involving the left middle cerebral artery  Referring provider: Samara Renae MD  Visit Diagnosis:     ICD-10-CM    1. Aphasia, post-stroke I69.920          Session 6  24    Assessment:   Patient is benefitting from skilled speech therapy and is making steady progress toward functional goals.  Patient is appropriate to continue with skilled speech therapy intervention, as indicated by initial plan of care.    Goal Status:   Long Term Goals  Pt. will improve cognitive, linguistic skills to allow for completion of daily activities and interactions by: : by 12 weeks  Short Term Goals  Follow Verbal Directions: 2 step verbal directions;with 90% accuracy;with minimal cuing;in 6 weeks  Pt. will answer multiple choice questions regarding information in a : simple written paragraph;with minimal cuing;with 70% accuracy;in 6 weeks  Pt. will form sentences to explain, describe with : with minimal cuing;with 70% accuracy;with 80% accuracy;in 12 weeks  Patient will write single words with : with minimal cuing;with 90% accuracy;in 8 weeks  Pt will: be able to orally read sentences with min cues in 6 weeks.             Plan / Patient Education:     Continue with initial plan of care.    Subjective:     Patient presents as alert and cooperative during the session.  Pain Ratin        Objective:     Homework was completed: Pt filled in missing letters of words in sentences with 100% accuracy. Patient filled in missing letters of words given a picture with 96% accuracy.  Oral reading short sentences with 95% accuracy.  Verbal expression: Pt formed sentences to describe action pictures with 95% accuracy. Patient formed sentences to explain function of objects with 75% accuracy. Patient was able to name a verb to complete a sentence with 85%  accuracy.  Writing: Patient was able to write down a verb to complete a sentences with 45% accuracy. Oral spelling of the word sometimes helped the pt write the word correctly.      Interventions Today   Interventions MINUTES   Speech - Language 55   Cognition    Dysphagia    Assistive technology    Voice            Total 55     Mary Sheehan MS, CCC-SLP  5/22/2018

## 2021-06-18 NOTE — PROGRESS NOTES
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  Patient is appropriate to continue with skilled speech therapy intervention, as indicated by initial plan of care.    Goal Status:   Long Term Goals  Pt. will improve cognitive, linguistic skills to allow for completion of daily activities and interactions by: : by 12 weeks  Short Term Goals  Follow Verbal Directions: 2 step verbal directions;with 90% accuracy;with minimal cuing;in 6 weeks  Pt. will answer multiple choice questions regarding information in a : simple written paragraph;with minimal cuing;with 70% accuracy;in 6 weeks  Pt. will form sentences to explain, describe with : with minimal cuing;with 70% accuracy;with 80% accuracy;in 12 weeks  Patient will write single words with : with minimal cuing;with 90% accuracy;in 8 weeks  Pt will: be able to orally read sentences with min cues in 6 weeks.              Plan / Patient Education:     Continue with initial plan of care.    Subjective:     Patient presents as alert and cooperative during the session.  Pain Ratin        Objective:     Homework was completed: Pt copied written sentences with 100% accuracy. Pt filled in missing letters of words in categories with 91% accuracy.  Verbal expression: Pt provided a missing word to complete a sentence with 90% accuracy uncued. Patient described action pictures using appropriate sentences with 80% accuracy uncued.  Writing: Pt wrote a missing word to complete a sentence with 20% accuracy.  Oral reading: Patient orally read a short paragraph  with 8 errors consisting of word omissions or word substitutions.   Reading comprehension: Patient was able to answer multiple choice questions about short paragraphs with 80% accuracy.      Interventions Today   Interventions MINUTES   Speech - Language 57   Cognition    Dysphagia    Assistive technology    Voice            Total 57     Mary Sheehan MS, CCC-SLP  5/17/2018

## 2021-06-18 NOTE — PROGRESS NOTES
"[facility]   Speech Therapy Daily Progress     Patient Name: Brendon Ojeda  Date: 2018  Visit #: 15  Referral Diagnosis: [unfilled]  Referring provider: Samara Renae MD  Visit Diagnosis:     ICD-10-CM    1. 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    Goal Status:   Long Term Goals  No Data Recorded  Short Term Goals  No Data Recorded              Plan / Patient Education:     Continue with initial plan of care.  Progress with home program as tolerated.    Subjective:     Patient presents as alert and cooperative during the session.  Pain Ratin    Patient remains highly motivated despite difficulties and long journey towards recovery.  Since previous session with same therapist, ORLY Nix, patient's affect and conversational skills have improved. Patient asking more appropriate follow up questions, maintaining conversation with less dysfluency and/or perceived word finding issues, and smiling more overall per SLP judgment.     Objective:     Cognitive-Linguistic   Homework Discussion: patient completed homework independently; occasional spelling errors noted however patient aware of mistakes and able to communicate through difficulties with therapist. Patient demonstrates good use of strategies to \"sound out the word\" and spell accordingly. Noted to have increased difficulty identifying and sounding out nasal sounds?    Writing (given specific picture stimuli): 5/10 independently; improved to 7/10 given cues to \"try again\" and mod verbal cues. Following completion and accurate spelling of words, patient re-wrote errored words 1x    Identifying Incorrectly Spelled Words (F:2): 100% accuracy independently     Oral Reading (short passage/paragraph level): 1) x2 errors, with 100% self correction, 2) x6 errors, with 100% self correction. NOTE: had " "patient re-read following passage following errors and patient's accuracy improved as only x2 errors were observed, 3) 100% accuracy independently, 4) x2 errors, with 100% self correction.     Natural Conversation: patient able to maintain conversation; increased word finding deficits noted as conversation progressed. Patient expressed frustration re: inability to communicate when he becomes frustrated or \"excited\". Provided patient with additional strategies and recommendations (e.g. Carry small note pad to write messages or draw if needed). Patient able to recall speech strategies as well; patient pulled out handout re: word retrieval strategies as well but reported it can be hard to use in the \"heat of the moment\".         Interventions Today   Interventions MINUTES   Speech - Language 55   Cognition    Dysphagia    Assistive technology    Voice            Total 55     Elizabeth Nix MS, CCC-SLP  6/21/2018    "

## 2021-06-18 NOTE — PROGRESS NOTES
[facility]   Speech Therapy Daily Progress     Patient Name: Brendon Ojeda  Date: 2018  Visit #: 14  Referral Diagnosis: Expressive aphasia, cerebral infarction involving the left middle cerebral artery  Referring provider: Samara Renae MD  Visit Diagnosis:     ICD-10-CM    1. 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  Patient is appropriate to continue with skilled speech therapy intervention, as indicated by initial plan of care.  Goal Status:   Long Term Goals  Pt. will improve cognitive, linguistic skills to allow for completion of daily activities and interactions by: : by 12 weeks  Short Term Goals  Follow Verbal Directions: 2 step verbal directions;with 90% accuracy;with minimal cuing;in 6 weeks(Met)  Pt. will answer multiple choice questions regarding information in a : simple written paragraph;with minimal cuing;with 70% accuracy;in 6 weeks(Met)  Pt. will form sentences to explain, describe with : with minimal cuing;with 70% accuracy;with 80% accuracy;in 12 weeks(progressing)  Patient will write single words with : with minimal cuing;with 90% accuracy;in 8 weeks(progressing)  Pt will: be able to orally read sentences with 90% min cues in 6 weeks. (Met)  Added goals:   Pt will be able to orally read short paragraphs with only 2-3 errors in 4 weeks.  Pt will be able to write short sentences given a word to include with 80% accuracy with min cues in 4 weeks.                Plan / Patient Education:     Continue with initial plan of care.    Subjective:     Patient presents as alert and cooperative during the session.  Pain Ratin        Objective:     Word recall strategies: Speech therapist educated the patient on several word recall strategies including description, association, first letter, categorization, word substitutions, and hand  gestures. A written sheet of the word recall strategies was provided to the patient. The patient was able to describe items with 100% accuracy. Patient was able to name associated words with 100% accuracy. Patient was able to name categories with 100% accuracy. Patient was able to name synonyms with 85% accuracy. Pt was able to demonstrate actions using hand gestures with 100% accuracy.  Word recall: Patient wrote down 3 items in a category given with 90% accuracy.   Spelling: He spelled the words he listed in the categories with 56% accuracy. Some of the words had 7-8 letters.  Verbal expression: Patient was able to answer a question about a sentence with complete sentence with 46% accuracy. To answer the questions, he had to recall 3 items, which was difficult for him. He recalled 2 items with 100% accuracy.      Interventions Today   Interventions MINUTES   Speech - Language 60   Cognition    Dysphagia    Assistive technology    Voice            Total 60     Mary Sheehan MS, CCC-SLP  6/19/2018

## 2021-06-18 NOTE — PROGRESS NOTES
[facility]   Speech Therapy Daily Progress     Patient Name: Brendon Ojeda  Date: 2018  Visit #: 16  Referral Diagnosis: expressive aphasia, cerebral infarction involving the left middle cerebral artery  Referring provider: Samara Renae MD  Visit Diagnosis:     ICD-10-CM    1. 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  Patient is appropriate to continue with skilled speech therapy intervention, as indicated by initial plan of care.  Goal Status:   Long Term Goals  Pt. will improve cognitive, linguistic skills to allow for completion of daily activities and interactions by: : by 12 weeks  Short Term Goals  Follow Verbal Directions: 2 step verbal directions;with 90% accuracy;with minimal cuing;in 6 weeks(Met)  Pt. will answer multiple choice questions regarding information in a : simple written paragraph;with minimal cuing;with 70% accuracy;in 6 weeks(Met)  Pt. will form sentences to explain, describe with : with minimal cuing;with 70% accuracy;with 80% accuracy;in 12 weeks(Met))  Patient will write single words with : with minimal cuing;with 90% accuracy;in 8 weeks(progressing)  Pt will: be able to orally read sentences with 90% min cues in 6 weeks. (Met)  Added goals:   Pt will be able to orally read short paragraphs with only 2-3 errors in 4 weeks.  Pt will be able to write short sentences given a word to include with 80% accuracy with min cues in 4 weeks.            Plan / Patient Education:     Continue with revised plan of care.    Subjective:     Patient presents as alert and cooperative during the session.  Pain Ratin        Objective:     Homework was completed.: Food packet- Pt was able to write in a word given a description of that food with 80% accuracy.    He obtained 95% accuracy on word recall task given description of the item. He  completed sentences with a missing word with 100% accuracy. He wrote down 2 items in a given category with 100% accuracy. Patient was able to write down single word responses to questions about foods with 100% accuracy.  Verbal expression: Pt was able to form sentences given a word to include with 90% accuracy.   Writing: Patient wrote short sentences given a word to include with 50% accuracy with no spelling errors and 50% of the time with only 1 spelling error in the sentence.      Interventions Today   Interventions MINUTES   Speech - Language 53   Cognition    Dysphagia    Assistive technology    Voice            Total 53     Mary Sheehan MS, CCC-SLP  6/26/2018

## 2021-06-18 NOTE — PROGRESS NOTES
[facility]   Speech Therapy Daily Progress     Patient Name: Brendon Ojeda  Date: 2018  Visit #: 7  Referral Diagnosis: expressive aphasia, cerebral infarction involving the left middle cerebral artery  Referring provider: Samara Renae MD  Visit Diagnosis:     ICD-10-CM    1. 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  Patient is appropriate to continue with skilled speech therapy intervention, as indicated by initial plan of care.    Goal Status:   Long Term Goals  Pt. will improve cognitive, linguistic skills to allow for completion of daily activities and interactions by: : by 12 weeks  Short Term Goals  Follow Verbal Directions: 2 step verbal directions;with 90% accuracy;with minimal cuing;in 6 weeks  Pt. will answer multiple choice questions regarding information in a : simple written paragraph;with minimal cuing;with 70% accuracy;in 6 weeks  Pt. will form sentences to explain, describe with : with minimal cuing;with 70% accuracy;with 80% accuracy;in 12 weeks  Patient will write single words with : with minimal cuing;with 90% accuracy;in 8 weeks  Pt will: be able to orally read sentences with min cues in 6 weeks.              Plan / Patient Education:     Continue with initial plan of care.    Subjective:     Patient presents as alert and cooperative during the session.  Pain Ratin        Objective:     Homework was completed: Pt filled in missing word of sentences with 90% accuracy. Pt answered wh type questions with a 1-3 word written response with 76% accuracy.  Oral reading: Patient orally read short sentences with 95% accuracy.  Writing: Pt wrote 4 letter words with 80% accuracy, but he required increased time and some self-corrections.  Verbal expression: Pt was able to form sentences to explain function of object with 100% accuracy.  Patient  stated that he is having difficulty with using money and making change. This will be addressed in a later session.        Interventions Today   Interventions MINUTES   Speech - Language 30    Cognition    Dysphagia    Assistive technology    Voice            Total 30     Mary Sheehan MS, CCC-SLP  5/24/2018

## 2021-06-18 NOTE — PROGRESS NOTES
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 Aphasia, post-stroke I69.920          Session 11  24    Assessment:   Patient is benefitting from skilled speech therapy and is making steady progress toward functional goals.  Patient is appropriate to continue with skilled speech therapy intervention, as indicated by initial plan of care.  Patient is progressing appropriately towards accomplisment of goals.    Goal Status:   Long Term Goals  Pt. will improve cognitive, linguistic skills to allow for completion of daily activities and interactions by: : by 12 weeks  Short Term Goals  Follow Verbal Directions: 2 step verbal directions;with 90% accuracy;with minimal cuing;in 6 weeks(Met)  Pt. will answer multiple choice questions regarding information in a : simple written paragraph;with minimal cuing;with 70% accuracy;in 6 weeks(Met)  Pt. will form sentences to explain, describe with : with minimal cuing;with 70% accuracy;with 80% accuracy;in 12 weeks(progressing)  Patient will write single words with : with minimal cuing;with 90% accuracy;in 8 weeks(progressing)  Pt will: be able to orally read sentences with 90% min cues in 6 weeks. (progressing)            Plan / Patient Education:     Continue with initial plan of care.    Subjective:     Patient presents as alert and cooperative during the session.  Pain Ratin        Objective:   Homework was completed: Pt filled in a word to complete a sentence with 100% accuracy. He filled in a missing verb of sentences with 75% accuracy. Pt filled in a missing noun in a sentence with 80% accuracy.  Writing: Patient was able to fill in missing letters of words in various categories with 26% accuracy. Pt was able to write  short 4-5 word sentences to describe action pictures with 40% accuracy uncued, and 100% accuracy with min cues for spelling.  Verbal expression: Pt was able to explain likenesses and differences between 2 items with 100% accuracy.  Pt was able to explain how to complete an activity with 100% accuracy.      Interventions Today   Interventions MINUTES   Speech - Language 58   Cognition    Dysphagia    Assistive technology    Voice            Total 58     Mary Sheehan MS, CCC-SLP  6/7/2018

## 2021-06-18 NOTE — PROGRESS NOTES
[facility] Discharge Summary  Patient Name: Brendon Ojeda  Date: 6/25/2018  Referral Diagnosis: CVA  Referring provider: Samara Renea MD  Visit Diagnosis:   1. Decreased activities of daily living (ADL)     2. Memory loss     3. Weakness of right arm         Goal status: Unable to assess as patient did not return.    Patient was seen for 1 visit. The patient discontinued therapy, did not return.    The patient will need a new referral to resume.    Thank you for your referral.  Odalis Xie  6/25/2018  12:56 PM

## 2021-06-18 NOTE — PROGRESS NOTES
"[facility]   Speech Therapy Daily Progress     Patient Name: Brendon Ojeda  Date: 2018  Visit #: 9  Referral Diagnosis: expressive aphasia, cerebral infarction involving the left middle cerebral artery  Referring provider: Samara Renae MD  Visit Diagnosis:     ICD-10-CM    1. 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  Patient is appropriate to continue with skilled speech therapy intervention, as indicated by initial plan of care.  Patient is progressing appropriately towards accomplisment of goals.  Goal Status:   Long Term Goals  Pt. will improve cognitive, linguistic skills to allow for completion of daily activities and interactions by: : by 12 weeks  Short Term Goals  Follow Verbal Directions: 2 step verbal directions;with 90% accuracy;with minimal cuing;in 6 weeks(Met)  Pt. will answer multiple choice questions regarding information in a : simple written paragraph;with minimal cuing;with 70% accuracy;in 6 weeks  Pt. will form sentences to explain, describe with : with minimal cuing;with 70% accuracy;with 80% accuracy;in 12 weeks(progressing)  Patient will write single words with : with minimal cuing;with 90% accuracy;in 8 weeks(progressing)  Pt will: be able to orally read sentences with 90% min cues in 6 weeks. (progressing)      Plan / Patient Education:     Continue with initial plan of care.    Subjective:     Patient presents as alert and cooperative during the session.  Pain Ratin        Objective:     Homework was completed: Pt filled in missing verb of sentences with 100% accuracy, but spelling of the words was 65% accuracy. Pt wrote in missing nouns to answer \"what\" questions with 100% accuracy, but spelling of the words was 74% accuracy.  Oral reading: Pt orally read sentences with 95% accuracy. He orally read short paragraphs with 2 " errors in each of 3 paragraphs  and no errors in one paragraph.  Categorization: Pt was able to write down 1 additional item that belongs with 2 given items with 68% accuracy uncued.  Reading comprehension:Pt answered multiple choice questions regarding information in short written paragraphs with 83% accuracy. He answered Yes/No questions about short written paragraphs with 100% accuracy.      Interventions Today   Interventions MINUTES   Speech - Language 55   Cognition    Dysphagia    Assistive technology    Voice            Total 55     Mary Sheehan MS, CCC-SLP  5/31/2018

## 2021-06-18 NOTE — PROGRESS NOTES
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  Patient is appropriate to continue with skilled speech therapy intervention, as indicated by initial plan of care.  Goal Status:   Long Term Goals  Pt. will improve cognitive, linguistic skills to allow for completion of daily activities and interactions by: : by 12 weeks  Short Term Goals  Follow Verbal Directions: 2 step verbal directions;with 90% accuracy;with minimal cuing;in 6 weeks(Met)  Pt. will answer multiple choice questions regarding information in a : simple written paragraph;with minimal cuing;with 70% accuracy;in 6 weeks(Met)  Pt. will form sentences to explain, describe with : with minimal cuing;with 70% accuracy;with 80% accuracy;in 12 weeks(Met))  Patient will write single words with : with minimal cuing;with 90% accuracy;in 8 weeks(progressing)  Pt will: be able to orally read sentences with 90% min cues in 6 weeks. (Met)  Added goals:   Pt will be able to orally read short paragraphs with only 2-3 errors in 4 weeks.  Pt will be able to write short sentences given a word to include with 80% accuracy with min cues in 4 weeks.                     Plan / Patient Education:     Continue with initial plan of care.    Subjective:     Patient presents as alert and cooperative during the session.  Pain Ratin        Objective:     Writing: Patient was able to write sentences given a word to include with 82% accuracy uncued. The other 18% were wrong by only 1 misspelling of a word.  Verbal expression: Patient was able to describe  things with appropriate sentences with 95% accuracy.  Synonyms: Patient named synonyms with 70% accuracy uncued.  Memory: Patient was able to recall 3 items given in a sentence with 57% accuracy,        Interventions Today   Interventions MINUTES   Speech - Language 58   Cognition    Dysphagia    Assistive technology    Voice            Total 58     Mary Sheehan MS, CCC-SLP  6/28/2018

## 2021-06-18 NOTE — PROGRESS NOTES
[facility]   Speech Therapy Daily Progress     Patient Name: Brendon Ojeda  Date: 2018  Visit #: 13  Referral Diagnosis: Expressive aphasia, cerebral infarction involving the left middle cerebral artery  Referring provider: Samara Renae MD  Visit Diagnosis:     ICD-10-CM    1. 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  Patient is appropriate to continue with skilled speech therapy intervention, as indicated by initial plan of care.  Goal Status:   Long Term Goals  Pt. will improve cognitive, linguistic skills to allow for completion of daily activities and interactions by: : by 12 weeks  Short Term Goals  Follow Verbal Directions: 2 step verbal directions;with 90% accuracy;with minimal cuing;in 6 weeks(Met)  Pt. will answer multiple choice questions regarding information in a : simple written paragraph;with minimal cuing;with 70% accuracy;in 6 weeks(Met)  Pt. will form sentences to explain, describe with : with minimal cuing;with 70% accuracy;with 80% accuracy;in 12 weeks(progressing)  Patient will write single words with : with minimal cuing;with 90% accuracy;in 8 weeks(progressing)  Pt will: be able to orally read sentences with 90% min cues in 6 weeks. (Met)  Added goals:   Pt will be able to orally read short paragraphs with only 2-3 errors in 4 weeks.  Pt will be able to write short sentences given a word to include with 80% accuracy with min cues in 4 weeks.    Plan / Patient Education:     Continue with revised plan of care.    Subjective:     Patient presents as alert and cooperative during the session.  Pain Ratin        Objective:     Homework was completed: Patient was able to write in answers to questions with 92% accuracy.  Writing: Patient wrote in a word to complete analogies with 80% accuracy.  Verbal expression: Pt was able to answer  questions about information in a sentence by recalling 3 items with 75% accuracy. Pt formed sentences given a word to include with 95% accuracy. Patient was able to talk in conversation about his family and their jobs. He also was able to talk about what was on the news recently. He talked about his high school and sports with only occasional pauses for word retrieval and thought organization into sentences.      Interventions Today   Interventions MINUTES   Speech - Language 60   Cognition    Dysphagia    Assistive technology    Voice            Total 60     Mary Sheehan MS, CCC-SLP  6/14/2018

## 2021-06-18 NOTE — PROGRESS NOTES
[facility]   Speech Therapy Daily Progress / visit note    Patient Name: Brendon Ojeda  Date: 2018  Visit #: 10  Referral Diagnosis: Expressive aphasia, cerebral infarction involving left middle cerebral artery  Referring provider: Samara Renae MD  Visit Diagnosis:     ICD-10-CM    1. Aphasia, post-stroke I69.920          Session 10 of 24    Assessment:   Patient is benefitting from skilled speech therapy and is making steady progress toward functional goals.  Patient is appropriate to continue with skilled speech therapy intervention, as indicated by initial plan of care.  Patient is progressing appropriately towards accomplisment of goals.  Goal Status:   Long Term Goals  Pt. will improve cognitive, linguistic skills to allow for completion of daily activities and interactions by: : by 12 weeks  Short Term Goals  Follow Verbal Directions: 2 step verbal directions;with 90% accuracy;with minimal cuing;in 6 weeks(Met)  Pt. will answer multiple choice questions regarding information in a : simple written paragraph;with minimal cuing;with 70% accuracy;in 6 weeks(Met)  Pt. will form sentences to explain, describe with : with minimal cuing;with 70% accuracy;with 80% accuracy;in 12 weeks(progressing)  Patient will write single words with : with minimal cuing;with 90% accuracy;in 8 weeks(progressing)  Pt will: be able to orally read sentences with 90% min cues in 6 weeks. (Met)  Added goals:   Pt will be able to orally read short paragraphs with only 2-3 errors in 4 weeks.  Pt will be able to write short sentences given a word to include with 80% accuracy with min cues in 4 weeks.         Plan / Patient Education:     Continue with initial plan of care.    Subjective:     Patient presents as alert and cooperative during the session.  Pain Ratin        Objective:     Homework: Patient was able to answer Yes/No and multiple choice questions regarding written short paragraphs with 100%  accuracy. He was able to list 10 animals and 10 drinks with 100% accuracy.  Oral reading: Pt was able to orally read short written paragraphs with 77% accuracy.  Verbal expression: Patient was able to form sentences given a noun to include with 70% accuracy and a verb to include with 100% accuracy. He did take additional time to form the sentences.  Patient was able to describe objects with 100% accuracy for at least 2-3 descriptors.  Written expression: The patient was able to fill in a missing noun to complete a sentence with 76% accuracy. His spelling is improving in accuracy.  Word recall: Patient was able to think of a word to complete a sentence with 100% accuracy.        Interventions Today   Interventions MINUTES   Speech - Language 58   Cognition    Dysphagia    Assistive technology    Voice            Total 58     Mary Sheehan MS, CCC-SLP  6/5/2018

## 2021-06-18 NOTE — PROGRESS NOTES
[facility]   Speech Therapy Daily Progress     Patient Name: Brendon Ojeda  Date: 5/15/2018  Visit #: 4  Referral Diagnosis: Expressive aphasia, cerebral infarction involving left middle cerebral atery  Referring provider: Samara Renae MD  Visit Diagnosis:     ICD-10-CM    1. Aphasia, post-stroke I69.920          Session 4 of 24    Assessment:   Patient is benefitting from skilled speech therapy and is making steady progress toward functional goals.  Patient is appropriate to continue with skilled speech therapy intervention, as indicated by initial plan of care.    Goal Status:   Long Term Goals        Pt. will improve cognitive, linguistic skills to allow for completion of daily activities and interactions by: : by 12 weeks     Short term goals:  Follow Verbal Directions: 2 step verbal directions;with 90% accuracy;with minimal cuing;in 6 weeks  Pt. will answer multiple choice questions regarding information in a : simple written paragraph;with minimal cuing;with 70% accuracy;in 6 weeks(progressing)  Pt. will form sentences to explain, describe with : with minimal cuing;with 70% accuracy;with 80% accuracy;in 12 weeks(progressing)  Patient will write single words with : with minimal cuing;with 90% accuracy;in 8 weeks (progressing)  Pt will: be able to orally read sentences with min cues in 6 weeks. (progressing)            Plan / Patient Education:     Continue with initial plan of care.    Subjective:     Patient presents as alert and cooperative during the session.  Pain Ratin      Objective:     Homework was completed: Pt was able to write the name of a pictured item with 80% accuracy.  Pt was able to answer multiple choice questions regarding information in short written paragraphs with 100% accuracy and Yes/No questions with 100% accuracy.   Oral reading: Patient was able to read short paragraphs with 6 corrections, 11 corrections, 6 corrections, and 8 corrections. He usually will  correct his own errors.  Verbal expression: Pt formed short sentences to describe action pictures with 80% accuracy uncued.  Writing: Patient was able to copy short sentences to describe action pictures with 66% accuracy. He will substitute letters, omit letters, and reverse the order of letters.        Interventions Today   Interventions MINUTES   Speech - Language 58   Cognition    Dysphagia    Assistive technology    Voice            Total 58     Mary Sheehan MS, CCC-SLP  5/15/2018

## 2021-06-19 NOTE — PROGRESS NOTES
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  Patient is appropriate to continue with skilled speech therapy intervention, as indicated by initial plan of care.  Goal Status:   Long Term Goals  Pt. will improve cognitive, linguistic skills to allow for completion of daily activities and interactions by: : by 12 weeks  Short Term Goals  Follow Verbal Directions: 2 step verbal directions;with 90% accuracy;with minimal cuing;in 6 weeks(Met)  Pt. will answer multiple choice questions regarding information in a : simple written paragraph;with minimal cuing;with 70% accuracy;in 6 weeks(Met)  Pt. will form sentences to explain, describe with : with minimal cuing;with 70% accuracy;with 80% accuracy;in 12 weeks(Met))  Patient will write single words with : with minimal cuing;with 90% accuracy;in 8 weeks(progressing)  Pt will: be able to orally read sentences with 90% min cues in 6 weeks. (Met)  Added goals:   Pt will be able to orally read short paragraphs with only 2-3 errors in 4 weeks.  Pt will be able to write short sentences given a word to include with 80% accuracy with min cues in 4 weeks.                Plan / Patient Education:     Continue with initial plan of care.    Subjective:     Patient presents as alert and cooperative during the session.  Pain Ratin        Objective:     Homework was completed: Patient copied words indicating numbers with 100% accuracy. Pt answered questions regarding what items are needed to complete an activity with 80% accuracy. Some spelling errors  continue.  Patient had indicated that he was having some trouble with writing and saying the correct numbers.  Money concepts: Patient was able to answer questions about making change with 84% accuracy. Pt was able to write in the amount of money designations he would need to make a certain amount with 83% accuracy. Patient was slow to figure out the money amounts, but was understanding the concepts with min cues from the therapist.  Time/clock: Patient was able to tell the correct time from a clock face with 80% accuracy.       Interventions Today   Interventions MINUTES   Speech - Language 57   Cognition    Dysphagia    Assistive technology    Voice            Total 57     Mary Sheehan MS, CCC-SLP  7/5/2018

## 2021-06-19 NOTE — PROGRESS NOTES
"[facility]   Speech Therapy Daily Progress     Patient Name: Brendon Ojeda  Date: 7/10/2018  Visit #: 20  Referral Diagnosis: [unfilled]  Referring provider: Rogelio Robert MD  Visit Diagnosis:     ICD-10-CM    1. 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  Patient is appropriate to continue with skilled speech therapy intervention, as indicated by initial plan of care.    Goal Status:   Long Term Goals  Pt. will improve cognitive, linguistic skills to allow for completion of daily activities and interactions by: : by 12 weeks  Short Term Goals  Follow Verbal Directions: 2 step verbal directions;with 90% accuracy;with minimal cuing;in 6 weeks  Pt. will answer multiple choice questions regarding information in a : simple written paragraph;with minimal cuing;with 70% accuracy;in 6 weeks  Pt. will form sentences to explain, describe with : with minimal cuing;with 70% accuracy;with 80% accuracy;in 12 weeks  Patient will write single words with : with minimal cuing;with 90% accuracy;in 8 weeks              Plan / Patient Education:     Continue with initial plan of care.    Subjective:     Patient presents as alert and cooperative during the session.  Pain Ratin    Patient attended therapy in good spirits. Patient independently recalled why primary SLP is absent and was able to maintain appropriate conversation re: her trip fishing. When asked how his Fourth of July was, patient reported \"everyday is a holiday\".     Objective:     Homework (Functional Math): patient completed check task (3/3) and answering math problems (52/55) independently suggestive of >90% accuracy. Patient however reported increased difficulty with cash deduction; Noted x5 (67% accuracy independently) errors on cash deduction task; patient willing to participate in education and cuing to " "improve accuracy. Patient noted to demonstrate increased difficulty with interpreting dimes. Patient required moderate verbal cues to improve to 100% accuracy.    Functional Math: (clarification of term \"twice\"); patient able to answer word problems given term \"twice\" with 100% accuracy following significant education prompted by difficulty during homework task with term \"twice\" in word problems    Functional Cognition:  Setting analog clocks given specific time (digital clock stimuli)- 95% accuracy independently; required mod verbal reminders to check work 1x     Written Expression: (write x2 items/categorization by use) required mod assistance to initiate task; following assistance, patient able to complete with approx 80% accuracy given increased processing time. Required min verbal cues to improve accuracy to 100%; more needed for clarification for patient. No spelling errors were noted.     Sentence Generation (given specific stimuli word) via W/E: approx 70% accuracy independently. Patient noted to alternate between lower and upper case letters during written expression task. Patient occasionally omits suffixes and does not utilize correct tense. When presented with sentence aloud, patient notices errors independently.         Interventions Today   Interventions MINUTES   Speech - Language 50   Cognition    Dysphagia    Assistive technology    Voice            Total 50     Elizabeth Nix MS, CCC-SLP  7/10/2018    "

## 2021-06-19 NOTE — PROGRESS NOTES
Assessment:  1.  Non-insulin-dependent diabetes mellitus, has been uncontrolled but control appears to be improving.  2.  Hypertension with borderline control.  3.  Hyperlipidemia, checking status.  4.  Expressive aphasia as residual from stroke earlier this year.  5.  Anemia, normocytic normochromic per his rheumatologist.    Plan: Check fasting A1c, lipid hepatic and basic profiles.  For further workup of the anemia will recheck CBC, do serum ferritin, vitamin B12 and folate levels.  Plan follow-up in 3 months and be fasting.  Sent prescription for losartan 50 mg-#30-refillable ×5.  Sent prescription for new glucometer as well as strips and lancets that his insurance would cover and recommending to do blood sugar testing twice a day if he is willing to do that.  Do some blood sugar testing 2 hours after his main meal of the day given that he has had improvement in his fasting blood sugars in the morning.  He very much dislikes taking any medication, he is hoping to get off medication as time goes by.  Continue working on his weight reduction through careful diet etc.  Congratulated him on the weight reduction he has accomplished in the progress with his aphasia with his speech therapy which is not finished.  His wife was thinking he should have an abdominal CAT scan because of his diabetes and his anemia.  I explained that at this time that would not be a typical thing to do because it would be considered to be more risk of hurting his kidneys and finding anything useful to find.  Explained we can always reconsider that.  They appeared to understand and agree.    Subjective: 68-year-old male presenting on follow-up for the above.  His wife is here with him.  She brings in a report of a blood test from June when they saw Dr. Slick Mccollum his rheumatologist and hemoglobin was 12.0 at that time and he was advised to have that followed up every 2-3 months.  His RBC indices were normal at that time.  Regarding diabetes  he is checking blood sugars at least daily and while many of the readings were in the upper 100 range in June, they are now down in the 140 and occasional low 150 range.  He is taking the losartan 50 mg daily.  Regarding hypertension no headaches or dizziness or leg swelling.  Regarding lipids no constipation or muscle aching or muscle weakness, he does have some loose stool about 2 times per week.  While he continues to have some expressive aphasia, it is better than what he had been.  See speech therapy notes.  He has not had any known blood loss.  Past Medical History:   Diagnosis Date     DM (diabetes mellitus) (H)      Psoriatic arthritis (H)      Allergies   Allergen Reactions     Naproxen Hives     Current Outpatient Prescriptions   Medication Sig Dispense Refill     blood glucose test (ACCU-CHEK GUIDE) strips Use 1 each As Directed 2 (two) times a day. Dispense brand per patient's insurance at pharmacy discretion. 100 strip 5     cholecalciferol, vitamin D3, (VITAMIN D3) 2,000 unit Tab Take 2,000 Units by mouth daily.        clopidogrel (PLAVIX) 75 mg tablet TAKE 1 TABLET(75 MG) BY MOUTH DAILY 30 tablet 6     etanercept 50 mg/mL (0.98 mL) PnIj Inject 50 mg under the skin once a week. On Fridays       folic acid (FOLVITE) 1 MG tablet        generic lancets (ACCU-CHEK FASTCLIX LANCING DEV) Dispense brand per patient's insurance at pharmacy discretion. Use twice daily to test blood sugar. 100 each 5     losartan (COZAAR) 50 MG tablet Take 1 tablet (50 mg total) by mouth daily. Take one tablet by mouth daily 30 tablet 5     metFORMIN (FORTAMET) 1000 MG (OSM) 24 hr tablet TAKE ONE TABLET BY MOUTH TWICE DAILY WITH MEALS 60 tablet 3     methotrexate 2.5 MG tablet Take 10 mg by mouth once a week. Take 4 tabs weekly on Saturdays       multivitamin (MULTIVITAMIN) per tablet Take 1 tablet by mouth daily.       simvastatin (ZOCOR) 40 MG tablet TAKE 1 TABLET(40 MG) BY MOUTH DAILY 30 tablet 6     blood glucose meter  (ONETOUCH ULTRA2) Dispense glucometer brand per patient's insurance at pharmacy discretion. 1 each 0     blood glucose test strips Use 1 each As Directed 2 (two) times a day. Dispense brand per patient's insurance at pharmacy discretion. 100 strip 5     No current facility-administered medications for this visit.      All other review of systems are negative.  He denies any foot numbness.    Objective:/80  Pulse 60  Resp 20  Wt 193 lb (87.5 kg)  SpO2 98%  BMI 29.35 kg/m2  Head normocephalic.  External ears eyes nose and oropharynx negative.  Neck supple without adenopathy or thyromegaly.  Lungs clear.  Heart regular rate and rhythm without murmur.  Abdomen shows no masses tenderness or hepatosplenomegaly.  No pedal edema.  He is alert, he does have some hesitation with his speech and word finding but does express himself better than last time.

## 2021-06-19 NOTE — PROGRESS NOTES
"[facility]   Speech Therapy Daily Progress     Patient Name: Brendon Ojeda  Date: 2018  Visit #: 21  Referral Diagnosis: [unfilled]  Referring provider: Samara Renae MD  Visit Diagnosis:     ICD-10-CM    1. 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  Patient is appropriate to continue with skilled speech therapy intervention, as indicated by initial plan of care.    Goal Status:   Long Term Goals  Pt. will improve cognitive, linguistic skills to allow for completion of daily activities and interactions by: : by 12 weeks  Short Term Goals  Follow Verbal Directions: 2 step verbal directions;with 90% accuracy;with minimal cuing;in 6 weeks  Pt. will answer multiple choice questions regarding information in a : simple written paragraph;with minimal cuing;with 70% accuracy;in 6 weeks  Pt. will form sentences to explain, describe with : with minimal cuing;with 70% accuracy;with 80% accuracy;in 12 weeks  Patient will write single words with : with minimal cuing;with 90% accuracy;in 8 weeks              Plan / Patient Education:     Continue with initial plan of care.    Subjective:     Patient presents as alert and cooperative during the session.  Pain Ratin    Patient attended therapy in good spirits with all homework completed. Patient and therapist took walk during therapy to discuss goals; patient reported his biggest challenge continues to be verbal expression when he is emotional/\"excited\".     Objective:     Homework Overview: patient completed various tasks as homework:   1) categorization by use (recall x2)- 100% accuracy independently with x3 spelling errors; self corrected spelling errors with min assistance   2) math word problems (Y/N)- 100% accuracy independently  3) math deduction word problems- 60% accuracy independently; to correct errors " "as homework  4) naming words from descriptions- 100% accuracy independently; x10 spelling errors across 44 trials. Patient able to correct errors given min assistance    Cognitive-Linguistic  Deciphering Hidden Message: (paragraph) approx 55-60% accuracy given increased processing time and mod-max verbal cues to initiate initial half of task. Patient able to complete independently following direction and model.   Generative Namin) female names- x8 given 60 seconds  2) states- x13 given 60 seconds  3) states (using strategy of regions)- x12 given 60 seconds  Patient did not appear to benefit from grouping strategy.  Patient had extreme difficulty with recall of women's name that we encountered on our walk; patient attempted various strategies to recall name and was able to describe via \"referral specialist for [facility]\"    Written Expression  Writing to Dictation: (phrase level) 90% accuracy independently given x1 immediate self correction; increased difficulty with compound words. Patient tends to separate words    Interventions Today   Interventions MINUTES   Speech - Language 55   Cognition    Dysphagia    Assistive technology    Voice            Total 55     Elizabeth Nix MS, CCC-SLP  2018    "

## 2021-06-19 NOTE — PROGRESS NOTES
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 Aphasia, post-stroke I69.920          Session 18 of 24    Assessment:   Patient is benefitting from skilled speech therapy and is making steady progress toward functional goals.  Patient is appropriate to continue with skilled speech therapy intervention, as indicated by initial plan of care.  Patient is progressing appropriately towards accomplisment of goals.    Goal Status:   Long Term Goals  No Data Recorded  Short Term Goals  No Data Recorded              Plan / Patient Education:     Continue with initial plan of care.  Progress with home program as tolerated.    Subjective:     Patient presents as alert and cooperative during the session.  Pain Rating:       Objective:     Generative naming: He named 14 kitchen items; 15 bathroom items; and 12 occupations uncued. The speech therapist educated the patient on using sub categories to aid with rapid recall of items. For example, occupation- medical jobs, education jobs, business jobs, construction jobs.  Writing: Patient wrote sentences to describe action pictures with 100% accuracy in syntax and semantics, but he had 1-2 spelling errors in each sentence. Usually he only had 1 letter wrong in a word.  Patient wrote sentences on a given topic with moderate cueing for spelling of words and for ideas about what to write about. When writing a thank you note, he wrote only 1 sentence.  Conversational speech: Patient was able to use appropriate sentences to talk about jobs in conversation with only occasional pauses for word retrieval. 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  The patient indicated that  he has some difficulty with numbers and writing numbers when filling out a check. The speech therapist provided the patient with a list of numbers written out so that he can practice the numbers and refer to the list when writing a check.For next session, we will practice writing money amounts and time.      Interventions Today   Interventions MINUTES   Speech - Language 58   Cognition    Dysphagia    Assistive technology    Voice            Total 58     Mary Sheehan MS, CCC-SLP  7/3/2018

## 2021-06-19 NOTE — PROGRESS NOTES
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  Patient is appropriate to continue with skilled speech therapy intervention, as indicated by initial plan of care.  Patient is progressing appropriately towards accomplisment of goals.  Goal Status:   Long Term Goals  Pt. will improve cognitive, linguistic skills to allow for completion of daily activities and interactions by: : by 12 weeks  Short Term Goals  Follow Verbal Directions: 2 step verbal directions;with 90% accuracy;with minimal cuing;in 6 weeks(Met)  Pt. will answer multiple choice questions regarding information in a : simple written paragraph;with minimal cuing;with 70% accuracy;in 6 weeks(Met)  Pt. will form sentences to explain, describe with : with minimal cuing;with 70% accuracy;with 80% accuracy;in 12 weeks(Met))  Patient will write single words with : with minimal cuing;with 90% accuracy;in 8 weeks(progressing)  Pt will: be able to orally read sentences with 90% min cues in 6 weeks. (Met)  Added goals:   Pt will be able to orally read short paragraphs with only 2-3 errors in 4 weeks.(Met)  Pt will be able to write short sentences given a word to include with 80% accuracy with min cues in 4 weeks.(progressing)      Plan / Patient Education:     Continue with initial plan of care.    Subjective:     Patient presents as alert and cooperative during the session.  Pain Ratin        Objective:     Portions of The [place] Test for Differential Diagnosis of Aphasia were re- administered to compare performance with initial  evaluation session.  Auditory comprehension: Patient followed 2 and 3 step directions with 100% accuracy and complex directions with 80% accuracy. During the initial evaluation session, patient followed 1 step directions with 100%, 2 step with 80%, and 3 step with 40%.  He answered moderately complex questions with 100% and complex questions with 80% accuracy. During initial evaluation session, he answered moderate Yes/No questions with 100% and complex questions with 60% accuracy.  Reading comprehension: Patient orally read sentences with 80% accuracy and short paragraphs with 0-2 errors. During the initial evaluation session, he orally read sentences and short paragraphs with 100% accuracy.  He obtained 100% accuracy comprehending sentences and 75% accuracy comprehending short paragraphs. During the initial evaluation session, he comprehended sentences with 100% accuracy and short paragraphs with 50% accuracy.  Verbal expression: Patient named pictures with 100% accuracy. During the initial evaluation session , he named pictures with 90% accuracy. He formed sentences given a word to include with 100% accuracy. He was able to use words with multiple meanings in different sentences with 100% accuracy. He explained meanings of proverbs with 100% accuracy. During the initial evaluation session, he was unable to form complete sentences in conversation and he obtained only 33% accuracy forming sentences to describe or explain.  Written expression: Patient wrote his name and address with 100% accuracy. He wrote single words of 3 to 11 letters with 80% accuracy. He wrote sentences with 60% accuracy.During the initial evaluation session, he could write his name, but had spelling errors when writing his address. He wrote 3 letter words with 80% accuracy, and was unable to write sentences.    Thus, the patient has made significant improvement in all language areas including auditory comprehension, reading comprehension,  verbal expression, and written expression.        Interventions Today   Interventions MINUTES   Speech - Language 60   Cognition    Dysphagia    Assistive technology    Voice            Total 60     Mary Sheehan MS, CCC-SLP  7/19/2018

## 2021-06-19 NOTE — PROGRESS NOTES
[facility] Daily Progress / Discharge Summary    Patient Name: Brendon Ojeda  Date: 7/24/2018  Visit #: 24  Referral Diagnosis: expressive aphasia, cerebral infarction involving the left middle cerebral artery  Referring provider: Samara Renae MD  Visit Diagnosis:     ICD-10-CM    1. Aphasia, post-stroke I69.920          Assessment:   HEP/POC compliance is  good . Patient attended 24 out of 24 visits with no cancellations.   Patient demonstrates understanding/independence with home program for language and cognitive tasks. At the time of discharge, he was able to participate in conversations with only an occasional word recall deficit. His speech intelligibility was 100% in conversation. He was following verbal directions of moderate complexity, and comprehending written paragraphs. He continues to have some difficulty with spelling words and forming written sentences. Patient obtained a mild impairment composite severity rating scale on the Cognitive Linguistic Quick Test. Patient plans on returning to his job in August.  Goal Status:   Long Term Goals  Pt. will improve cognitive, linguistic skills to allow for completion of daily activities and interactions by: : by 12 weeks(Met)  Short Term Goals  Follow Verbal Directions: 2 step verbal directions;with 90% accuracy;with minimal cuing;in 6 weeks(Met)  Pt. will answer multiple choice questions regarding information in a : simple written paragraph;with minimal cuing;with 70% accuracy;in 6 weeks(Met)  Pt. will form sentences to explain, describe with : with minimal cuing;with 70% accuracy;with 80% accuracy;in 12 weeks(Met))  Patient will write single words with : with minimal cuing;with 90% accuracy;in 8 weeks(inconsistently met)  Pt will: be able to orally read sentences with 90% min cues in 6 weeks. (Met)  Added goals:   Pt will be able to orally read short paragraphs with only 2-3 errors in 4 weeks.(Met)  Pt will be able to write short  sentences given a word to include with 80% accuracy with min cues in 4 weeks.(Not met)              Plan / Patient Education:     Initial plan of care has been completed. Education has been completed.  Patient has responded appropriately to skilled Speech Therapy intervention.  The patient met most of his goals and has demonstrated understanding of/independence in the home program for language tasks.  No further therapy is required at this time. Patient is planning to return to work soon.  The patient will initiate contact if questions or concerns arise.    Subjective:     Pain Ratin    Patient is cooperative and attentive in therapy.    Objective:     The Cognitive Linguistic Quick Test was administered with the following results:   Attention:164 (mild impairment)  Memory: 167 (WNL)  Executive functions: 19 (Moderate impairment)  Language: 29 (WNL)  Visuospatial skills: 78 (mild impairment)  Clock drawin (mild impairment)  Composite severity rating score: 3.2 (Mild impairment)    Interventions Today  Interventions MINUTES   Speech - Language 60   Cognition    Dysphagia    Assistive technology    Voice            Total 60     Mary Sheehan MS, CCC-SLP  2018

## 2021-06-19 NOTE — PROGRESS NOTES
[facility]   Speech Therapy Daily Progress     Patient Name: Brendon Ojeda  Date: 2018  Visit #: 22  Referral Diagnosis: Expressive aphasia, cerebral infarction involving the left middle cerebral artery  Referring provider: Rogelio Robert MD  Visit Diagnosis:     ICD-10-CM    1. 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  Patient is appropriate to continue with skilled speech therapy intervention, as indicated by initial plan of care.  Patient is progressing appropriately towards accomplisment of goals.  Goal Status:   Long Term Goals  Pt. will improve cognitive, linguistic skills to allow for completion of daily activities and interactions by: : by 12 weeks  Short Term Goals  Follow Verbal Directions: 2 step verbal directions;with 90% accuracy;with minimal cuing;in 6 weeks(Met)  Pt. will answer multiple choice questions regarding information in a : simple written paragraph;with minimal cuing;with 70% accuracy;in 6 weeks(Met)  Pt. will form sentences to explain, describe with : with minimal cuing;with 70% accuracy;with 80% accuracy;in 12 weeks(Met))  Patient will write single words with : with minimal cuing;with 90% accuracy;in 8 weeks(progressing)  Pt will: be able to orally read sentences with 90% min cues in 6 weeks. (Met)  Added goals:   Pt will be able to orally read short paragraphs with only 2-3 errors in 4 weeks.(progressing)  Pt will be able to write short sentences given a word to include with 80% accuracy with min cues in 4 weeks.(progressing)                    Plan / Patient Education:     Continue with initial plan of care.    Subjective:     Patient presents as alert and cooperative during the session.  Pain Ratin        Objective:   Homework: Patient corrected math word problems with 100% accuracy.  Reading comprehension: Patient was able  to answer questions regarding information in moderate paragraphs with 100% accuracy. His oral reading rate is slow with occasional self corrections.  Patient was able to follow written directions with 60% accuracy. He was very slow to follow the directions. Speech therapist needed to cue patient to pay attention to details of the directions.  Written expression:   Patient wrote sentences given a word to include with 100% accuracy and only 3 spelling errors in 20 sentences. Patient wrote short paragraphs given a topic with 100% accuracy with only 2 spelling errors.   Conversational speech: Only some mild word recall deficits were noted. Patient was able to verbalize what he does on his job and what skills he needs to return to his job. He plans to return to work in September or October. He initiated conversation regarding the therapist's vacation and talked about his previous vacations.      Interventions Today   Interventions MINUTES   Speech - Language 57   Cognition    Dysphagia    Assistive technology    Voice            Total 57     Mary Sheehan MS, CCC-SLP  7/17/2018

## 2021-06-21 NOTE — PROGRESS NOTES
Assessment:  1.  Non-insulin-dependent diabetes mellitus, checking status.  2.  Hypertension, not at goal.  3.  Hyperlipidemia, checking status.  4.  Expressive aphasia with some worsening according to his wife.    Plan: Increase losartan dose to 100 mg-#90-1 p.o. daily, refillable x1.  Asked him to get blood pressure recheck in 3-4 weeks.  If that is okay and lab is fine then follow-up within 4 months.  Congratulated him on weight reduction.  He wanted to wait 6 months for follow-up but I explained that with his various situations that it is better to follow-up sooner.  Refer to speech therapy for consideration of whether any further speech therapy will help with his expressive aphasia.    Subjective: 68-year-old male presenting for follow-up on the above.  Regarding diabetes he is checking blood sugars daily, most of the readings are in the 130-160 range but occasionally higher.  He has lost further weight through his dieting.  Regarding hypertension no headaches or dizziness or leg swelling.  Regarding lipids no constipation or muscle aching or muscle weakness, he does have a loose stool about once every 2 weeks or so.  He does feel that is from the medication but he is comfortable with that level of occurrence.  His wife notes that she feels that he is having more difficulty with the expressive a aphasia and she wonders about trying to have him get some further speech therapy and see if that will help.  It does affect her communication.  Past Medical History:   Diagnosis Date     DM (diabetes mellitus) (H)      Psoriatic arthritis (H)      Allergies   Allergen Reactions     Naproxen Hives     Current Outpatient Medications   Medication Sig Dispense Refill     blood glucose test strips Use 1 each As Directed 2 (two) times a day. Dispense brand per patient's insurance at pharmacy discretion. 100 strip 5     cholecalciferol, vitamin D3, (VITAMIN D3) 2,000 unit Tab Take 2,000 Units by mouth daily.        clopidogrel  "(PLAVIX) 75 mg tablet TAKE 1 TABLET(75 MG) BY MOUTH DAILY 30 tablet 6     etanercept 50 mg/mL (0.98 mL) PnIj Inject 50 mg under the skin once a week. On Fridays       folic acid (FOLVITE) 1 MG tablet        generic lancets (ACCU-CHEK FASTCLIX LANCING DEV) Dispense brand per patient's insurance at pharmacy discretion. Use twice daily to test blood sugar. 100 each 5     losartan (COZAAR) 100 MG tablet Take 1 tablet (100 mg total) by mouth daily. 90 tablet 1     metFORMIN (GLUCOPHAGE) 1000 MG tablet Take 1 tablet (1,000 mg total) by mouth 2 (two) times a day with meals. 180 tablet 3     methotrexate 2.5 MG tablet Take 10 mg by mouth once a week. Take 4 tabs weekly on Saturdays       multivitamin (MULTIVITAMIN) per tablet Take 1 tablet by mouth daily.       simvastatin (ZOCOR) 40 MG tablet TAKE 1 TABLET(40 MG) BY MOUTH DAILY 30 tablet 6     No current facility-administered medications for this visit.      All other review of systems are negative for any other changes.    Objective:/70 (Patient Site: Right Arm, Patient Position: Sitting, Cuff Size: Adult Large)   Pulse 66   Resp 20   Ht 5' 7\" (1.702 m)   Wt 187 lb (84.8 kg)   SpO2 97%   BMI 29.29 kg/m    HEENT examination shows no acute change.  Neck supple without adenopathy or thyromegaly.  Lungs clear.  Heart regular rate and rhythm without murmur.  Abdomen shows no masses tenderness or hepatosplenomegaly.  No pedal edema.  He is alert, he does have some element of expressive aphasia.  "

## 2021-06-22 NOTE — PROGRESS NOTES
[facility]   Evaluation and Initial Plan of Care    Patient Name: Brendon Ojeda  Date of evaluation: 12/4/2018  Referral Diagnosis: expressive aphasia  Referring provider: Rogelio Robert MD  Visit Diagnosis:     ICD-10-CM    1. 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 Expressive aphasia R47.01          Assessment:      Patient presents with mild expressive aphasia, which he reports as interfering with his communication with family and co-workers.    Long Term Goals  Pt. will improve cognitive, linguistic skills to allow for completion of daily activities and interactions by: : 4 weeks    Short Term Goals  Pt. will generate 15 items: in 3 categories;in 4 weeks;with 90% accuracy;with minimal cuing  Pt. will form sentences to explain, describe with : with minimal cuing;with 90% accuracy;in 4 weeks  Pt will form sentences using words with multiple meanings with 90% accuracy with min cues by 4 weeks.    Patient goal:  To better his speech  Patient's expectations/goals are realistic.    Rehab potential: Good based on prior level of function, evaluation results and motivation and cooperation.     Plan / Patient Instructions:      Plan of Care:  Authorization / Certification Start Date: 12/04/18  Authorization / Certification End Date: 01/15/19  Authorization / Certification Number of Visits: 4  Communication with: Referral Source  Patient Related Instruction: Nature of Condition;Treatment plan and rationale;Basis of treatment  Times per Week: 1  Number of Weeks: 4  Number of Visits: 4  Discharge Planning: Pt will achieve his speech therapy goals prior to discharge.      Plan:   Speech therapy 1 times a week for 4 weeks.  Ongoing  Patient instruction in  evaluation results, treatment plan/rationale and home program.    Education:   Patient participated in education regarding evaluation results, treatment plan/rationale and home program  Patient demonstrate understanding the education provided.        Subjective:         Social information:   Living Situation:single family home and lives with others    Occupation:retired    History of Present Illness:    Brendon is a 68 y.o. male who presents to therapy today with complaints of difficulty verbally expressing himself. Date of onset/duration of symptoms is 4/27/2018.  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 He received speech therapy while hospitalized and than as an outpatient from 5/3/18 to 7/24/18 for 24 visits. He states that he continues to have some difficulty expressing himself at work and with his wife.    Pertinent history includes: CVA  Patient presents as alert and cooperative during the session.  Patient reports no pain     Objective:      Examination    Oral motor function was not impaired.    Motor speech was not impaired.   Speech intelligibility was approximately 100 % at the conversation level    Voice was not impaired.     Trach/Vent: N/A  Portions of the [place] Test for Differential Diagnosis of Aphasia was administered along with portions of the Reading Comprehension Battery of Aphasia.  Auditory Comprehension:  Mildly impaired   Patient follows 3 step directions with 100 % accuracy, and complex directions with 60% accuracy.  Patient answers  moderate yes/no questions with 100 % accuracy, and complex questions with 40% accuracy.  Patient can follow  moderate conversation.     Reading Comprehension: Mildly impaired  Patient can comprehend  simple paragraphs with 100 % accuracy.  Patient can orally read  simple paragraphs with 100 % accuracy.   His oral reading rate is slow, but this is his baseline as reported by the patient. He also states that he hasn't been a good reader in the past. If he gets interrupted during reading, he has to start over or else he doesn't understand what he read.    Verbal Expression: Mildly impaired  Patient performs sentence completion tasks with 100 % accuracy.  Patient  completes responsive naming task with 100 % accuracy.  Patient completes confrontation naming task with 100 % accuracy.  Patient answers simple single word questions with 100 % accuracy.  Patient  can form sentences in conversation.  Patient can form sentences to describe with 100 % accuracy.  Patient can form sentences to explain with 60 % accuracy.  Patient has difficulty expressing more abstract concepts and explaining procedures.    Written Expression: Mildly impaired  Patient can write  name and address with 100 % accuracy.  Patient can write single words with 60 % accuracy.  Patient can write sentences with 75 % accuracy.   Patient reports that he has never been a good speller.    Cognition: Patient was oriented to month, day of month, year, day of the week, tie of day, place, city, and state. He knew the situation which brought him to therapy. He was able to relate some current events that he saw in the news accurately.    Interventions Today  Interventions MINUTES   Speech - Language 60   Cognition    Dysphagia    Assistive technology    Voice            Total 60          Mary Sheehan MS, CCC-SLP  12/4/2018                                                               Medicare/medicaid certification  December 10, 2018        Patient: Brendon Ojeda   MR Number: [id number]   YOB: 1950   Date of Visit: 12/4/2018       Dear Dr. Robert,    Thank you for this referral.   We are seeing Brendon Ojeda for Speech Language Therapy.    Medicare and/or Medicaid requires physician review and approval of the treatment plan. Please review the plan of care and verify that you agree with the therapy plan of care by co-signing this note.      Plan of Care  Authorization / Certification Start Date: 12/04/18  Authorization / Certification End Date: 01/15/19  Authorization / Certification Number of Visits: 4  Communication with: Referral Source  Patient Related Instruction: Nature of Condition;Treatment plan  and rationale;Basis of treatment  Times per Week: 1  Number of Weeks: 4  Number of Visits: 4  Discharge Planning: Pt will achieve his speech therapy goals prior to discharge.      Goals  Pt. will improve cognitive, linguistic skills to allow for completion of daily activities and interactions by: : 4 weeks    Pt. will generate 15 items: in 3 categories;in 4 weeks;with 90% accuracy;with minimal cuing  Pt. will form sentences to explain, describe with : with minimal cuing;with 90% accuracy;in 4 weeks    Pt will: form sentences using words with multiple meanings with 90% accuracy with min cues by 4 weeks.          If you have any questions or concerns, please don't hesitate to call.    Sincerely,    Mary Sheehan MS, CCC-SLP        Physician recommendation:     ___ Follow therapist's recommendation        ___ Modify therapy      I certify the need for these services furnished within this plan and while under my care.    Thank you.

## 2021-06-22 NOTE — PROGRESS NOTES
I met with Brendon Ojeda at the request of Dr. Robert to recheck his blood pressure.  Blood pressure medications on the MAR were reviewed with patient.    Patient has taken all medications as per usual regimen: Yes  Patient reports tolerating them without any issues or concerns: Yes    Vitals:    12/12/18 1554   BP: 134/70   Pulse: 79   SpO2: 98%       Blood pressure was taken, previous encounter was reviewed, recorded blood pressure below 140/90.  Patient was discharged and the note will be sent to the provider for final review. Dr. Robert notified. Pt will follow up at his next regular interval in 4 months time.

## 2021-06-23 NOTE — PROGRESS NOTES
Assessment:  1.  Pruritus.  2.  Increase stool frequency.  3.  Underlying diabetes mellitus, recently with control not as good.  4.  Hyperlipidemia.    Plan: We will have him stop for now and hold his simvastatin.  We will check basic profile, hepatic profile, CBC, and CK.  We will switch him from regular release metformin back to extended release Metformin 500 mg-2 p.o. twice daily-#360 refillable x1.  In the future we can potentially try to switch to Fortamet which was the brand name extended release Metformin which does come in 1000 mg pill or as the generic does not come in the thousand milligrams pill.  Will ask [facility] to see if they can provide him with the previous generic clopidogrel that he had before this trouble started when he started the recent new generic clopidogrel.  I did explain that it would not be common for that to cause the symptoms but timewise that fits best as the cause of the trouble he is having.  At minimum he will follow-up at the end of February for his regularly scheduled diabetic recheck.  Will call or return earlier depending on how he is doing.  I did ask him to call with an update in 2 weeks.    Addendum: 1/- see lab results from Monday, liver function tests are abnormal with obstructive type pattern with elevated direct bilirubin as well as total bilirubin elevation of alkaline phosphatase moderate with mild elevation of ALT and AST.  His blood count shows mild anemia, and CK is normal.  Called and talked with his wife.  She notes that he held his methotrexate also but has been taking his metformin.  Apparently his stools are somewhat better.  Recommend that he hold metformin as well as methotrexate right now in addition to holding the simvastatin.  Recommend that he get liver ultrasound to further evaluate that.  Scheduled for follow-up to see me in 1 week if he is gradually improving, but if worsening or not improving then either be seen at clinic or at emergency room  if worsening.  I explained that I was out of town for continuing education starting Thursday evening through next Tuesday and he can certainly see 1 of the other physicians at the clinic if need be.  She understands and agrees and will relay this to him.  She will also have them notify his rheumatologist.    Subjective: 68-year-old male presenting for follow-up on the above.  He does have the expressive aphasia from his previous stroke.  That is why I am reluctant to have him stop the clopidogrel altogether here even though he thinks that is the cause of his trouble.  He notes that when he switch to the new generic clopidogrel in the pharmacy had not notified him, and he and his wife noticed it was a different color in different shape etc., he also developed trouble with generalized itching, more frequent stools that are floating, and his blood sugars have been higher.  He remains otherwise on the other medication.  They noticed some bumps below his skin as well.  He thought he had hives.  His wife does not think he has hives.  /pmh  Allergies   Allergen Reactions     Naproxen Hives     Current Outpatient Medications   Medication Sig Dispense Refill     blood glucose test strips Use 1 each As Directed 2 (two) times a day. Dispense brand per patient's insurance at pharmacy discretion. 100 strip 5     cholecalciferol, vitamin D3, (VITAMIN D3) 2,000 unit Tab Take 2,000 Units by mouth daily.        clopidogrel (PLAVIX) 75 mg tablet TAKE 1 TABLET(75 MG) BY MOUTH DAILY 30 tablet 6     cyanocobalamin 1000 MCG tablet Take 1,000 mcg by mouth daily.       etanercept 50 mg/mL (0.98 mL) PnIj Inject 50 mg under the skin once a week. On Fridays       folic acid (FOLVITE) 1 MG tablet        generic lancets (ACCU-CHEK FASTCLIX LANCING DEV) Dispense brand per patient's insurance at pharmacy discretion. Use twice daily to test blood sugar. 100 each 5     losartan (COZAAR) 100 MG tablet Take 1 tablet (100 mg total) by mouth daily. 90  tablet 1     methotrexate 2.5 MG tablet Take 10 mg by mouth once a week. Take 4 tabs weekly on Saturdays       simvastatin (ZOCOR) 40 MG tablet TAKE 1 TABLET(40 MG) BY MOUTH DAILY 30 tablet 6     metFORMIN (GLUCOPHAGE XR) 500 MG 24 hr tablet Take 2 tablets (1,000 mg total) by mouth 2 (two) times a day. 360 tablet 1     multivitamin (MULTIVITAMIN) per tablet Take 1 tablet by mouth daily.       No current facility-administered medications for this visit.      All other review of systems currently negative.    Objective:/76   Pulse 68   Temp 98  F (36.7  C)   Wt 185 lb (83.9 kg)   SpO2 97%   BMI 28.98 kg/m    HEENT examination shows no acute change.  Neck supple.  Lungs clear.  Heart regular rate and rhythm without murmur.  Abdomen shows no masses tenderness or paraspinal megaly.  He does have some superficial small lipoma in the skin below the thoracic cage area anteriorly.  No pedal edema.  I do not see any urticaria.  I do not see any significant rash at all.  But he does have the itching throughout.  He has expressive aphasia but it is better than at his last visit.

## 2021-06-23 NOTE — PROGRESS NOTES
[facility]   Speech Therapy Daily Progress     Patient Name: Brendon Ojeda  Date: 1/10/2019  Visit #: 2  Referral Diagnosis: Expressive aphasia  Referring provider: Rogelio Robert MD  Visit Diagnosis:     ICD-10-CM    1. Expressive aphasia R47.01    2. 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  Patient is appropriate to continue with skilled speech therapy intervention, as indicated by initial plan of care.    Long Term Goals  Pt. will improve cognitive, linguistic skills to allow for completion of daily activities and interactions by: : 4 weeks     Short Term Goals  Pt. will generate 15 items: in 3 categories;in 4 weeks;with 90% accuracy;with minimal cuing  Pt. will form sentences to explain, describe with : with minimal cuing;with 90% accuracy;in 4 weeks  Pt will form sentences using words with multiple meanings with 90% accuracy with min cues by 4 weeks.                 Plan / Patient Education:     Continue with initial plan of care.    Subjective:     Patient presents as alert during the session.  Pain Ratin        Objective:     Patient feels that he is having some word retrieval issues affecting his communication.  He states that he doesn't finish sentences because he can't think of the words.   Patient was able to use sentences to explain problems you may encounter in different situations with 100% accuracy.  Generative naming: Patient named 16 cars independently, 13 sports with 2 min cues, and 14 vegetables with 1 cue.  Verbal expression: Patient was able to retell short paragraphs with 100%, 75%, 66%, 50%, and 80% accuracy.  Speech therapist educated the patient on word retrieval strategies of 1) description, 2) initial letter, 3) Categorization, 4) Association, 5) synonyms, and 6) hand gestures. Patient was provided with a written handout on the  strategies.        Interventions Today   Interventions MINUTES   Speech - Language 58   Cognition    Dysphagia    Assistive technology    Voice         58   Total      Mary Sheehan MS, CCC-SLP  1/10/2019

## 2021-06-23 NOTE — ANESTHESIA POSTPROCEDURE EVALUATION
Patient: Brendon Ojeda  ENDOSCOPIC ULTRASOUND, ENDOSCOPIC RETROGRADE CHOLANGIOPANCREATOGRAPHY. PANCREATIC HEAD MASS BIOPSY. BILIARY SPHINCTEROTOMY WITH STENT PLACEMENT  Anesthesia type: general    Patient location: Phase II Recovery  Last vitals:   Vitals:    01/18/19 1800   BP: 158/76   Pulse: 61   Resp: 16   Temp:    SpO2: 97%     Post vital signs: stable  Level of consciousness: awake and responds to simple questions  Post-anesthesia pain: pain controlled  Post-anesthesia nausea and vomiting: no  Pulmonary: unassisted, return to baseline  Cardiovascular: stable and blood pressure at baseline  Hydration: adequate  Anesthetic events: no    QCDR Measures:  ASA# 11 - Joellen-op Cardiac Arrest: ASA11B - Patient did NOT experience unanticipated cardiac arrest  ASA# 12 - Joellen-op Mortality Rate: ASA12B - Patient did NOT die  ASA# 13 - PACU Re-Intubation Rate: ASA13B - Patient did NOT require a new airway mgmt  ASA# 10 - Composite Anes Safety: ASA10A - No serious adverse event    Additional Notes:

## 2021-06-23 NOTE — TELEPHONE ENCOUNTER
Call placed to Brendon this morning in an attempt to schedule an appointment with [facility]. He informed me that he had an appointment scheduled at [facility] to see Dr Michael on 1/25.   He does not wish to schedule anything with [facility]. I did mention to him that he should sign a release of information and have his records copied to his primary care provider, Dr Rogelio Robert so he has an idea of what is going with his care regarding his pancreatic cancer.

## 2021-06-23 NOTE — TELEPHONE ENCOUNTER
Talked with patient and his wife this afternoon regarding results of the CT. Explained it was consistent with cancer in the pancreas. Advised he have ERCP to both biopsy and to probably place stent due to the obstruction of the biliary tract etc.   He is scheduled for ERCP tomorrow afternoon by Dr. Virchow.  I talked with his staff and he is aware of Dipesh having been on diclofenac and history of stroke etc. Per staff he is ok with just having patient not take the diclofenac tomorrow morning.

## 2021-06-23 NOTE — PROGRESS NOTES
[facility]   Speech Therapy Daily Progress /Discharge summary    Patient Name: Brendon Ojeda  Date: 2019  Visit #: 5  Referral Diagnosis: expressive aphasia  Referring provider: Rogelio Robert MD  Visit Diagnosis:     ICD-10-CM    1. 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  Patient demonstrates understanding/independence with home program for expressive language and word recall activities.  Patient has made progress in both receptive and expressive language tasks. He continues to have occasional word recall deficits and requires increased time to formulate his thoughts into sentences. He reports that he was a poor speller at baseline and he continues to have difficulty with spelling.    Goal Status:     Long Term Goals  Pt. will improve cognitive, linguistic skills to allow for completion of daily activities and interactions by: : 4 weeks     Short Term Goals  Pt. will generate 15 items: in 3 categories;in 4 weeks;with 90% accuracy;with minimal cuing (Met)  Pt. will form sentences to explain, describe with : with minimal cuing;with 90% accuracy;in 4 weeks(Met)  Pt will form sentences using words with multiple meanings with 90% accuracy with min cues by 4 weeks.  (Met)                  Plan / Patient Education:     Initial plan of care has been met and education completed.  Thus speech therapy will be discontinued at this time.    Subjective:     Patient presents as alert and cooperative during the session.  Pain Ratin        Objective:     Homework: Patient was able to form words using given endings and beginnings with 100% accuracy.  Sentence formulation: Patient made sentences given a word to include with 100% accuracy and with increased speed.  Word recall tasks: Patient was able to think of the word given a definition with 55% accuracy. Patient was able to complete a cumulative word building task with 53% accuracy. These  tasks were difficult due to having to write the words and he had many spelling errors.        Interventions Today   Interventions MINUTES   Speech - Language 55   Cognition    Dysphagia    Assistive technology    Voice            Total 55     Mary Sheehan MS, CCC-SLP  1/31/2019

## 2021-06-23 NOTE — PROGRESS NOTES
[facility]   Speech Therapy Daily Progress     Patient Name: Brendon Ojeda  Date: 2019  Visit #: 4  Referral Diagnosis: expressive aphasia  Referring provider: Rogelio Robert MD  Visit Diagnosis:     ICD-10-CM    1. 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  Patient is appropriate to continue with skilled speech therapy intervention, as indicated by initial plan of care.    Goal Status:     Long Term Goals  Pt. will improve cognitive, linguistic skills to allow for completion of daily activities and interactions by: : 4 weeks     Short Term Goals  Pt. will generate 15 items: in 3 categories;in 4 weeks;with 90% accuracy;with minimal cuing (Met)  Pt. will form sentences to explain, describe with : with minimal cuing;with 90% accuracy;in 4 weeks  Pt will form sentences using words with multiple meanings with 90% accuracy with min cues by 4 weeks.  (Met)             Plan / Patient Education:     Continue with initial plan of care.    Subjective:     Patient presents as alert and cooperative during the session.  Pain Ratin    Homework: Patient was able to name word opposites with 90% accuracy and word synonyms with 93% accuracy.     Objective:     Word recall tasks: Patient was able to write in a word given a definition with 87% accuracy. Patient was able to think of words given a definition and then split the words into 2 smaller words with 50% accuracy independently and 50% with mod cues. Patient is slow to think of words and required increased time to process the information.      Interventions Today   Interventions MINUTES   Speech - Language 55   Cognition    Dysphagia    Assistive technology    Voice            Total 55     Mary Sheehan MS, CCC-SLP  2019

## 2021-06-23 NOTE — ANESTHESIA CARE TRANSFER NOTE
Last vitals:   Vitals:    01/18/19 1635   BP: 171/84   Pulse: 70   Resp: 19   Temp: 36.3  C (97.4  F)   SpO2: 100%     Patient's level of consciousness is drowsy  Spontaneous respirations: yes  Maintains airway independently: yes  Dentition unchanged: yes  Oropharynx: oropharynx clear of all foreign objects    QCDR Measures:  ASA# 20 - Surgical Safety Checklist: WHO surgical safety checklist completed prior to induction    PQRS# 430 - Adult PONV Prevention: 4558F - Pt received => 2 anti-emetic agents (different classes) preop & intraop  ASA# 8 - Peds PONV Prevention: NA - Not pediatric patient, not GA or 2 or more risk factors NOT present  PQRS# 424 - Joellen-op Temp Management: 4559F - At least one body temp DOCUMENTED => 35.5C or 95.9F within required timeframe  PQRS# 426 - PACU Transfer Protocol: - Transfer of care checklist used  ASA# 14 - Acute Post-op Pain: ASA14B - Patient did NOT experience pain >= 7 out of 10

## 2021-06-23 NOTE — TELEPHONE ENCOUNTER
"Caller is wife (Gemini) with pt present.  Pt had clinical eval yesterday (1/14/19) for widespread itching, increased stools which 'float', and hyperlipidemia.  Was advised to discontinue simvastatin.    Hepatic profile results still pending.    Today, other family members and "[employer] shop staff expressed pt \"looking yellow.\"  \"Eyes appear yellow this morning.\"  \"Just feels ill all over.\"  No weakness or faintness.  No GI symptoms whatsoever.  No fevers.  No pain anywhere.    No loose stools yet today.  Has not yet eaten breakfast yet -> advised to do so.  Suggested hard boiled eggs and toast with tea.  No loosening foods or juices.  Pt agrees to do so.    Can family be contacted with lab results as soon as available?  They are asking if ED services are needed, based upon pt's current jaundiced status?    Please advise; thank you kindly.  Detailed voicemail message okay.     Dilcia Sams RN BSBA  [facility] RN Triage     Reason for Disposition    Jaundice    Protocols used: [id number]      "

## 2021-06-23 NOTE — PROGRESS NOTES
[facility]   Speech Therapy Daily Progress     Patient Name: Brendon Ojeda  Date: 2019  Visit #: 3  Referral Diagnosis: Expressive aphasia  Referring provider: Rogelio Robert MD  Visit Diagnosis:     ICD-10-CM    1. 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  Patient is appropriate to continue with skilled speech therapy intervention, as indicated by initial plan of care.     Long Term Goals  Pt. will improve cognitive, linguistic skills to allow for completion of daily activities and interactions by: : 4 weeks     Short Term Goals  Pt. will generate 15 items: in 3 categories;in 4 weeks;with 90% accuracy;with minimal cuing (Met)  Pt. will form sentences to explain, describe with : with minimal cuing;with 90% accuracy;in 4 weeks  Pt will form sentences using words with multiple meanings with 90% accuracy with min cues by 4 weeks.  (Met)      Plan / Patient Education:     Continue with initial plan of care.    Subjective:     Patient presents as alert and cooperative during the session.  Pain Ratin      Objective:     Generative naming: Patient was able to name 15 items in each of these categories with min cues occasionally:  Occupations-15 with 1 time cue  Animals-15 uncued  Beverages-15 with 1 cue  Vegetables-15 uncued  Presidents-15 with 3 cues  Sentence formulation: Patient was able to form sentences using given words with multiple meanings with 100% accuracy with min cues.  Patient formed sentences to explain how to complete an activity with 100% accuracy.  Patient formed sentences given 2 words to include with 80% accuracy.  Speech therapist discussed the word recall strategies on the written sheet that was provided to the patient. The patient was able to use description to identify items with 100% accuracy. The patient was able to name associated items  with 100% accuracy. The patient was able to name synonyms with 70% accuracy.        Interventions Today   Interventions MINUTES   Speech - Language 55   Cognition    Dysphagia    Assistive technology    Voice            Total 55     Mary Sheehan MS, CCC-SLP  1/22/2019

## 2021-06-23 NOTE — ANESTHESIA PREPROCEDURE EVALUATION
Anesthesia Evaluation      No history of anesthetic complications     Airway    Pulmonary - negative ROS                          Cardiovascular   (+) hypertension, , hypercholesterolemia, PVD     Neuro/Psych    (+) CVA (L MCA, residual expressive aphasia. Maintained on asa, plavix) ,     Endo/Other    (+) diabetes mellitus type 2, arthritis,   Pregnant now: psoriatic.     GI/Hepatic/Renal      Comments: Pancreatic head mass with obstructive jaundice, s/f endoscopic ultrasound and ERCP       Other findings: Results for RAJI SONI (MRN [id number]) as of 1/18/2019 12:56    1/14/2019 16:03  Sodium: 139  Potassium: 4.9  Chloride: 102  CO2: 25  Anion Gap, Calculation: 12  BUN: 18  Creatinine: 0.71  GFR MDRD Af Amer: >60  GFR MDRD Non Af Amer: >60  Calcium: 9.8  AST: 79 (H)  ALT: 169 (H)  ALBUMIN: 3.5  Protein, Total: 6.7  Alkaline Phosphatase: 279 (H)  Bilirubin, Total: 8.2 (H)  Bilirubin, Direct: 4.6 (H)  CK, Total: 60  Glucose: 325 (H)  WBC: 5.8  RBC: 3.84 (L)  Hemoglobin: 11.7 (L)  Hematocrit: 34.9 (L)  MCV: 91  MCH: 30.4  MCHC: 33.5  RDW: 11.1  Platelets: 210  MPV: 9.1        Dental                         Anesthesia Plan  Planned anesthetic: general endotracheal  GETA   Zofran 4 for antiemesis   ASA 3   Induction: intravenous   Anesthetic plan and risks discussed with: patient, spouse and child/children  Anesthesia plan special considerations: antiemetics,   Post-op plan: routine recovery

## 2021-06-23 NOTE — PROGRESS NOTES
Assessment:  1.  Adenocarcinoma of the pancreas.  2.  Non-insulin-dependent diabetes mellitus.  3.  Underlying hyperlipidemia.  4.  Underlying psoriatic arthritis.    Plan: Check hepatic profile today.  Continue to monitor his blood sugars as he is doing.  Assuming improvement in the liver function tests and if his blood sugars are not getting below 200, then I recommend that he increase his metformin gradually back to 1000 mg twice a day but if he is willing to do that.  At minimum follow-up at the end of February for next diabetic recheck to see me and at that time we will plan on doing fasting lipids etc.  And whether or not we restart a statin depends on what is advised for his treatment of the pancreatic cancer and the level of the lipids at that time.  Spent in excess of 25 minutes with at least 50% in counseling regarding the various issues.  I explained that with the CAT scan that was done, there is not any certainty one way or the other on whether or not there could be local metastases near the head of the pancreas.  But will obtain hematology oncology consultation, I think it will be likely that chemotherapy is recommended as initial treatment, then question of whether or not to pursue potential surgical therapy here.  Recommend that he stay off the simvastatin at this time.  Because he believes simvastatin aggravates diabetes, he states he will not go back on that in the future.  He also plans to stay off the methotrexate.  I advised that he follow-up with Dr. Mccollum regarding the management of the inflammatory arthritis issue.    Subjective: 68-year-old male presenting for follow-up after having the ERCP done on Friday by Dr. Preston.  The patient notes that his stools are now not floating, his itching is starting to get better, and he does have an appetite but he has been very careful in terms of what he has been eating as he wants to try to control his diabetes with diet.  He is taking metformin 500 mg  twice a day currently not the 1000 mg twice a day.  Past Medical History:   Diagnosis Date     DM (diabetes mellitus) (H)      Psoriatic arthritis (H)      Patient Active Problem List   Diagnosis     Diabetes type 2, uncontrolled (H)     Essential Hypercholesterolemia     Psoriasis     Primary Osteoarthritis Of The Lumbar Vertebrae     Psoriatic arthritis (H)     Lumbar Radiculopathy     Primary osteoarthritis involving multiple joints     Osteoarthritis of midfoot, unspecified laterality     Lumbar spinal stenosis     Cerebral infarction involving left middle cerebral artery (H)     Type 2 diabetes mellitus with hyperglycemia, without long-term current use of insulin (H)     Mixed hyperlipidemia     Expressive aphasia     Hypertension     Abnormal LFTs     Pancreatic mass     Allergies   Allergen Reactions     Naproxen Hives     Current Outpatient Medications   Medication Sig Dispense Refill     blood glucose test strips Use 1 each As Directed 2 (two) times a day. Dispense brand per patient's insurance at pharmacy discretion. 100 strip 5     cholecalciferol, vitamin D3, (VITAMIN D3) 2,000 unit Tab Take 2,000 Units by mouth daily.        clopidogrel (PLAVIX) 75 mg tablet TAKE 1 TABLET(75 MG) BY MOUTH DAILY 30 tablet 6     cyanocobalamin 1000 MCG tablet Take 1,000 mcg by mouth daily.       etanercept 50 mg/mL (0.98 mL) PnIj Inject 50 mg under the skin once a week. On Fridays       folic acid (FOLVITE) 1 MG tablet Take 1 mg by mouth daily.              generic lancets (ACCU-CHEK FASTCLIX LANCING DEV) Dispense brand per patient's insurance at pharmacy discretion. Use twice daily to test blood sugar. 100 each 5     losartan (COZAAR) 100 MG tablet Take 1 tablet (100 mg total) by mouth daily. 90 tablet 1     metFORMIN (GLUCOPHAGE XR) 500 MG 24 hr tablet Take 2 tablets (1,000 mg total) by mouth 2 (two) times a day. 360 tablet 1     multivitamin (MULTIVITAMIN) per tablet Take 1 tablet by mouth daily.       No current  facility-administered medications for this visit.      All other review of systems are negative.    Objective:/74   Pulse 66   Wt 178 lb (80.7 kg)   SpO2 99%   BMI 27.88 kg/m    Alert male who does have some element of expressive aphasia though it does appear better than it had been previously.  He is here by himself today because his wife had another appointment.  Examination overall and is not changed here today.  Reviewed path report with him from the needle biopsy done at the ERCP which showed adenocarcinoma of the pancreas.

## 2021-06-23 NOTE — TELEPHONE ENCOUNTER
Labs are not resulted yet, I will keep an eye out for them and call them later with results. Plan is pending.

## 2021-06-24 NOTE — TELEPHONE ENCOUNTER
Refill Approved    Rx renewed per Medication Renewal Policy. Medication was last renewed on 7/26/18.    Aiad Cantor, [facility] Triage/Med Refill 2/25/2019     Requested Prescriptions   Pending Prescriptions Disp Refills     clopidogrel (PLAVIX) 75 mg tablet [Pharmacy Med Name: CLOPIDOGREL 75MG TABLETS] 30 tablet 0     Sig: TAKE 1 TABLET(75 MG) BY MOUTH DAILY    Clopidogrel/Prasugrel/Ticagrelor Refill Protocol Passed - 2/25/2019  8:02 AM       Passed - PCP or prescribing provider visit in past 6 months      Last office visit with prescriber/PCP: 1/23/2019 OR same dept: 1/23/2019 Rogelio Robert MD OR same specialty: 1/23/2019 Rogelio Robert MD Last physical: Visit date not found Last MTM visit: Visit date not found     Next appt within 3 mo: Visit date not found  Next physical within 3 mo: Visit date not found  Prescriber OR PCP: Rogelio Robert MD  Last diagnosis associated with med order: 1. Stroke (H)  - clopidogrel (PLAVIX) 75 mg tablet [Pharmacy Med Name: CLOPIDOGREL 75MG TABLETS]; TAKE 1 TABLET(75 MG) BY MOUTH DAILY  Dispense: 30 tablet; Refill: 0    2. Cerebral infarction involving left middle cerebral artery (H)  - clopidogrel (PLAVIX) 75 mg tablet [Pharmacy Med Name: CLOPIDOGREL 75MG TABLETS]; TAKE 1 TABLET(75 MG) BY MOUTH DAILY  Dispense: 30 tablet; Refill: 0    If protocol passes may refill for 6 months if within 3 months of last provider visit (or a total of 9 months).             Passed - Hemoglobin in past 12 months    Hemoglobin   Date Value Ref Range Status   01/14/2019 11.7 (L) 14.0 - 18.0 g/dL Final

## 2021-07-03 NOTE — ADDENDUM NOTE
Addendum Note by Camacho Foreman PA-C at 2/15/2018 12:40 PM     Author: Camacho Foreman PA-C Service: -- Author Type: Physician Assistant    Filed: 2/15/2018 12:40 PM Date of Service: 2/15/2018 12:40 PM Status: Signed    : Camacho Foreman PA-C (Physician Assistant)    Encounter addended by: Camacho Foreman PA-C on: 2/15/2018 12:40 PM<BR>     Actions taken: Sign clinical note,  activity accessed, Diagnosis association updated